# Patient Record
Sex: MALE | Race: WHITE | NOT HISPANIC OR LATINO | Employment: OTHER | URBAN - METROPOLITAN AREA
[De-identification: names, ages, dates, MRNs, and addresses within clinical notes are randomized per-mention and may not be internally consistent; named-entity substitution may affect disease eponyms.]

---

## 2023-01-03 ENCOUNTER — HOSPITAL ENCOUNTER (EMERGENCY)
Facility: HOSPITAL | Age: 68
Discharge: ANOTHER HOSPITAL | End: 2023-01-04
Attending: INTERNAL MEDICINE
Payer: MEDICARE

## 2023-01-03 DIAGNOSIS — R07.9 CHEST PAIN: ICD-10-CM

## 2023-01-03 DIAGNOSIS — I21.4 NSTEMI (NON-ST ELEVATED MYOCARDIAL INFARCTION): ICD-10-CM

## 2023-01-03 DIAGNOSIS — I24.9 ACUTE CORONARY SYNDROME WITH HIGH TROPONIN: Primary | ICD-10-CM

## 2023-01-03 LAB
ALBUMIN SERPL-MCNC: 3.7 G/DL (ref 3.4–4.8)
ALBUMIN/GLOB SERPL: 1.1 RATIO (ref 1.1–2)
ALP SERPL-CCNC: 90 UNIT/L (ref 40–150)
ALT SERPL-CCNC: 35 UNIT/L (ref 0–55)
APTT PPP: 27.3 SECONDS (ref 23.2–33.7)
AST SERPL-CCNC: 18 UNIT/L (ref 5–34)
BASOPHILS # BLD AUTO: 0.05 X10(3)/MCL (ref 0–0.2)
BASOPHILS NFR BLD AUTO: 0.4 %
BILIRUBIN DIRECT+TOT PNL SERPL-MCNC: 0.4 MG/DL
BNP BLD-MCNC: 67 PG/ML
BUN SERPL-MCNC: 17 MG/DL (ref 8.4–25.7)
CALCIUM SERPL-MCNC: 9.1 MG/DL (ref 8.8–10)
CHLORIDE SERPL-SCNC: 109 MMOL/L (ref 98–107)
CK MB SERPL-MCNC: 2.8 NG/ML
CK SERPL-CCNC: 150 U/L (ref 30–200)
CO2 SERPL-SCNC: 22 MMOL/L (ref 23–31)
CREAT SERPL-MCNC: 1.38 MG/DL (ref 0.73–1.18)
EOSINOPHIL # BLD AUTO: 0.36 X10(3)/MCL (ref 0–0.9)
EOSINOPHIL NFR BLD AUTO: 3.1 %
ERYTHROCYTE [DISTWIDTH] IN BLOOD BY AUTOMATED COUNT: 13.5 % (ref 11.6–14.4)
GFR SERPLBLD CREATININE-BSD FMLA CKD-EPI: 56 MLS/MIN/1.73/M2
GLOBULIN SER-MCNC: 3.3 GM/DL (ref 2.4–3.5)
GLUCOSE SERPL-MCNC: 136 MG/DL (ref 82–115)
HCT VFR BLD AUTO: 50.5 % (ref 42–52)
HGB BLD-MCNC: 16.7 GM/DL (ref 14–18)
IMM GRANULOCYTES # BLD AUTO: 0.05 X10(3)/MCL (ref 0–0.04)
IMM GRANULOCYTES NFR BLD AUTO: 0.4 %
INR BLD: 1.04 (ref 0–1.3)
LYMPHOCYTES # BLD AUTO: 3.37 X10(3)/MCL (ref 0.6–4.6)
LYMPHOCYTES NFR BLD AUTO: 28.7 %
MAGNESIUM SERPL-MCNC: 2.2 MG/DL (ref 1.6–2.6)
MCH RBC QN AUTO: 30.2 PG
MCHC RBC AUTO-ENTMCNC: 33.1 MG/DL (ref 33–36)
MCV RBC AUTO: 91.3 FL (ref 80–94)
MONOCYTES # BLD AUTO: 1.09 X10(3)/MCL (ref 0.1–1.3)
MONOCYTES NFR BLD AUTO: 9.3 %
NEUTROPHILS # BLD AUTO: 6.84 X10(3)/MCL (ref 2.1–9.2)
NEUTROPHILS NFR BLD AUTO: 58.1 %
PLATELET # BLD AUTO: 189 X10(3)/MCL (ref 140–371)
PMV BLD AUTO: 10.1 FL (ref 9.4–12.4)
POTASSIUM SERPL-SCNC: 4.1 MMOL/L (ref 3.5–5.1)
PROT SERPL-MCNC: 7 GM/DL (ref 5.8–7.6)
PROTHROMBIN TIME: 13.5 SECONDS (ref 12.5–14.5)
RBC # BLD AUTO: 5.53 X10(6)/MCL (ref 4.7–6.1)
SODIUM SERPL-SCNC: 141 MMOL/L (ref 136–145)
TROPONIN I SERPL-MCNC: 0.44 NG/ML (ref 0–0.04)
WBC # SPEC AUTO: 11.8 X10(3)/MCL (ref 4.5–11.5)

## 2023-01-03 PROCEDURE — 85730 THROMBOPLASTIN TIME PARTIAL: CPT | Performed by: INTERNAL MEDICINE

## 2023-01-03 PROCEDURE — 25000003 PHARM REV CODE 250: Performed by: INTERNAL MEDICINE

## 2023-01-03 PROCEDURE — 85025 COMPLETE CBC W/AUTO DIFF WBC: CPT | Performed by: INTERNAL MEDICINE

## 2023-01-03 PROCEDURE — 93010 ELECTROCARDIOGRAM REPORT: CPT | Mod: ,,, | Performed by: INTERNAL MEDICINE

## 2023-01-03 PROCEDURE — 80053 COMPREHEN METABOLIC PANEL: CPT | Performed by: INTERNAL MEDICINE

## 2023-01-03 PROCEDURE — 96374 THER/PROPH/DIAG INJ IV PUSH: CPT

## 2023-01-03 PROCEDURE — 63600175 PHARM REV CODE 636 W HCPCS: Performed by: INTERNAL MEDICINE

## 2023-01-03 PROCEDURE — 96376 TX/PRO/DX INJ SAME DRUG ADON: CPT

## 2023-01-03 PROCEDURE — 93010 EKG 12-LEAD: ICD-10-PCS | Mod: ,,, | Performed by: INTERNAL MEDICINE

## 2023-01-03 PROCEDURE — 82553 CREATINE MB FRACTION: CPT | Performed by: INTERNAL MEDICINE

## 2023-01-03 PROCEDURE — 83880 ASSAY OF NATRIURETIC PEPTIDE: CPT | Performed by: INTERNAL MEDICINE

## 2023-01-03 PROCEDURE — 99285 EMERGENCY DEPT VISIT HI MDM: CPT | Mod: 25

## 2023-01-03 PROCEDURE — 85610 PROTHROMBIN TIME: CPT | Performed by: INTERNAL MEDICINE

## 2023-01-03 PROCEDURE — 83735 ASSAY OF MAGNESIUM: CPT | Performed by: INTERNAL MEDICINE

## 2023-01-03 PROCEDURE — 93005 ELECTROCARDIOGRAM TRACING: CPT

## 2023-01-03 PROCEDURE — 84484 ASSAY OF TROPONIN QUANT: CPT | Performed by: INTERNAL MEDICINE

## 2023-01-03 PROCEDURE — 82550 ASSAY OF CK (CPK): CPT | Performed by: INTERNAL MEDICINE

## 2023-01-03 PROCEDURE — 96375 TX/PRO/DX INJ NEW DRUG ADDON: CPT

## 2023-01-03 RX ORDER — NAPROXEN SODIUM 220 MG/1
324 TABLET, FILM COATED ORAL ONCE
Status: COMPLETED | OUTPATIENT
Start: 2023-01-03 | End: 2023-01-03

## 2023-01-03 RX ORDER — NITROGLYCERIN 20 MG/100ML
0-400 INJECTION INTRAVENOUS CONTINUOUS
Status: DISCONTINUED | OUTPATIENT
Start: 2023-01-03 | End: 2023-01-04 | Stop reason: HOSPADM

## 2023-01-03 RX ORDER — FENTANYL CITRATE 50 UG/ML
75 INJECTION, SOLUTION INTRAMUSCULAR; INTRAVENOUS ONCE
Status: COMPLETED | OUTPATIENT
Start: 2023-01-03 | End: 2023-01-03

## 2023-01-03 RX ORDER — HEPARIN SODIUM,PORCINE/D5W 25000/250
0-40 INTRAVENOUS SOLUTION INTRAVENOUS CONTINUOUS
Status: DISCONTINUED | OUTPATIENT
Start: 2023-01-03 | End: 2023-01-04 | Stop reason: HOSPADM

## 2023-01-03 RX ORDER — ONDANSETRON 2 MG/ML
4 INJECTION INTRAMUSCULAR; INTRAVENOUS ONCE
Status: COMPLETED | OUTPATIENT
Start: 2023-01-03 | End: 2023-01-03

## 2023-01-03 RX ADMIN — HEPARIN SODIUM 11.95 UNITS/KG/HR: 10000 INJECTION, SOLUTION INTRAVENOUS at 11:01

## 2023-01-03 RX ADMIN — ASPIRIN 81 MG 324 MG: 81 TABLET ORAL at 11:01

## 2023-01-03 RX ADMIN — TICAGRELOR 180 MG: 90 TABLET ORAL at 11:01

## 2023-01-03 RX ADMIN — NITROGLYCERIN 5 MCG/MIN: 20 INJECTION INTRAVENOUS at 11:01

## 2023-01-03 RX ADMIN — FENTANYL CITRATE 75 MCG: 50 INJECTION, SOLUTION INTRAMUSCULAR; INTRAVENOUS at 10:01

## 2023-01-03 RX ADMIN — NITROGLYCERIN 1 INCH: 20 OINTMENT TOPICAL at 11:01

## 2023-01-03 RX ADMIN — ONDANSETRON 4 MG: 2 INJECTION INTRAMUSCULAR; INTRAVENOUS at 10:01

## 2023-01-04 VITALS
WEIGHT: 260 LBS | OXYGEN SATURATION: 98 % | DIASTOLIC BLOOD PRESSURE: 90 MMHG | SYSTOLIC BLOOD PRESSURE: 149 MMHG | HEART RATE: 90 BPM | RESPIRATION RATE: 19 BRPM | TEMPERATURE: 98 F | HEIGHT: 72 IN | BODY MASS INDEX: 35.21 KG/M2

## 2023-01-04 LAB
AMPHET UR QL SCN: NEGATIVE
AORTIC ROOT ANNULUS: 0 CM
AORTIC VALVE CUSP SEPERATION: 0.96 CM
APPEARANCE UR: ABNORMAL
APTT PPP: 45.9 SECONDS (ref 23.2–33.7)
AV INDEX (PROSTH): 0.64
AV MEAN GRADIENT: 4 MMHG
AV PEAK GRADIENT: 8 MMHG
AV VALVE AREA: 2.34 CM2
AV VELOCITY RATIO: 0.77
BACTERIA #/AREA URNS AUTO: ABNORMAL /HPF
BARBITURATE SCN PRESENT UR: NEGATIVE
BENZODIAZ UR QL SCN: NEGATIVE
BILIRUB UR QL STRIP.AUTO: NEGATIVE MG/DL
BSA FOR ECHO PROCEDURE: 2.45 M2
CANNABINOIDS UR QL SCN: NEGATIVE
COCAINE UR QL SCN: NEGATIVE
COLOR UR AUTO: ABNORMAL
CV ECHO LV RWT: 0.72 CM
DOP CALC AO PEAK VEL: 1.37 M/S
DOP CALC AO VTI: 24.4 CM
DOP CALC LVOT AREA: 3.7 CM2
DOP CALC LVOT DIAMETER: 2.16 CM
DOP CALC LVOT PEAK VEL: 1.05 M/S
DOP CALC LVOT STROKE VOLUME: 57.13 CM3
DOP CALC MV VTI: 18.7 CM
DOP CALCLVOT PEAK VEL VTI: 15.6 CM
E WAVE DECELERATION TIME: 310.51 MSEC
E/A RATIO: 0.68
ECHO LV POSTERIOR WALL: 1.48 CM (ref 0.6–1.1)
EJECTION FRACTION: 59 %
FENTANYL UR QL SCN: POSITIVE
FRACTIONAL SHORTENING: 22 % (ref 28–44)
GLUCOSE UR QL STRIP.AUTO: NEGATIVE MG/DL
INTERVENTRICULAR SEPTUM: 1.67 CM (ref 0.6–1.1)
KETONES UR QL STRIP.AUTO: NEGATIVE MG/DL
LEFT ATRIUM SIZE: 3.7 CM
LEFT INTERNAL DIMENSION IN SYSTOLE: 3.2 CM (ref 2.1–4)
LEFT VENTRICLE DIASTOLIC VOLUME INDEX: 31.14 ML/M2
LEFT VENTRICLE DIASTOLIC VOLUME: 74.12 ML
LEFT VENTRICLE MASS INDEX: 109 G/M2
LEFT VENTRICLE SYSTOLIC VOLUME INDEX: 17.2 ML/M2
LEFT VENTRICLE SYSTOLIC VOLUME: 41.03 ML
LEFT VENTRICULAR INTERNAL DIMENSION IN DIASTOLE: 4.1 CM (ref 3.5–6)
LEFT VENTRICULAR MASS: 260.31 G
LEUKOCYTE ESTERASE UR QL STRIP.AUTO: NEGATIVE UNIT/L
LVOT MG: 2.33 MMHG
LVOT MV: 0.7 CM/S
MDMA UR QL SCN: NEGATIVE
MUCOUS THREADS URNS QL MICRO: ABNORMAL /LPF
MV MEAN GRADIENT: 1 MMHG
MV PEAK A VEL: 0.65 M/S
MV PEAK E VEL: 0.44 M/S
MV PEAK GRADIENT: 2 MMHG
MV STENOSIS PRESSURE HALF TIME: 90.05 MS
MV VALVE AREA BY CONTINUITY EQUATION: 3.06 CM2
MV VALVE AREA P 1/2 METHOD: 2.44 CM2
NITRITE UR QL STRIP.AUTO: NEGATIVE
OPIATES UR QL SCN: NEGATIVE
PCP UR QL: NEGATIVE
PH UR STRIP.AUTO: 5.5 [PH]
PH UR: 5.5 [PH] (ref 3–11)
PROT UR QL STRIP.AUTO: 30 MG/DL
PV PEAK VELOCITY: 0.87 CM/S
RBC #/AREA URNS AUTO: ABNORMAL /HPF
RBC UR QL AUTO: ABNORMAL UNIT/L
RIGHT VENTRICULAR END-DIASTOLIC DIMENSION: 2.8 CM
SP GR UR STRIP.AUTO: 1.02
SPECIFIC GRAVITY, URINE AUTO (.000) (OHS): 1.02 (ref 1–1.03)
SQUAMOUS #/AREA URNS AUTO: ABNORMAL /HPF
TROPONIN I SERPL-MCNC: 0.71 NG/ML (ref 0–0.04)
TROPONIN I SERPL-MCNC: 2 NG/ML (ref 0–0.04)
UROBILINOGEN UR STRIP-ACNC: 0.2 MG/DL
WBC #/AREA URNS AUTO: ABNORMAL /HPF

## 2023-01-04 PROCEDURE — 93010 ELECTROCARDIOGRAM REPORT: CPT | Mod: ,,, | Performed by: INTERNAL MEDICINE

## 2023-01-04 PROCEDURE — 85730 THROMBOPLASTIN TIME PARTIAL: CPT | Performed by: INTERNAL MEDICINE

## 2023-01-04 PROCEDURE — 84484 ASSAY OF TROPONIN QUANT: CPT | Performed by: INTERNAL MEDICINE

## 2023-01-04 PROCEDURE — 63600175 PHARM REV CODE 636 W HCPCS: Performed by: INTERNAL MEDICINE

## 2023-01-04 PROCEDURE — 93005 ELECTROCARDIOGRAM TRACING: CPT

## 2023-01-04 PROCEDURE — 80307 DRUG TEST PRSMV CHEM ANLYZR: CPT | Performed by: INTERNAL MEDICINE

## 2023-01-04 PROCEDURE — 25000003 PHARM REV CODE 250: Performed by: INTERNAL MEDICINE

## 2023-01-04 PROCEDURE — 93010 EKG 12-LEAD: ICD-10-PCS | Mod: ,,, | Performed by: INTERNAL MEDICINE

## 2023-01-04 PROCEDURE — 81001 URINALYSIS AUTO W/SCOPE: CPT | Performed by: INTERNAL MEDICINE

## 2023-01-04 RX ORDER — FENTANYL CITRATE 50 UG/ML
50 INJECTION, SOLUTION INTRAMUSCULAR; INTRAVENOUS
Status: COMPLETED | OUTPATIENT
Start: 2023-01-04 | End: 2023-01-04

## 2023-01-04 RX ORDER — ONDANSETRON 2 MG/ML
4 INJECTION INTRAMUSCULAR; INTRAVENOUS ONCE
Status: COMPLETED | OUTPATIENT
Start: 2023-01-04 | End: 2023-01-04

## 2023-01-04 RX ORDER — ATORVASTATIN CALCIUM 40 MG/1
40 TABLET, FILM COATED ORAL ONCE
Status: COMPLETED | OUTPATIENT
Start: 2023-01-04 | End: 2023-01-04

## 2023-01-04 RX ORDER — FENTANYL CITRATE 50 UG/ML
100 INJECTION, SOLUTION INTRAMUSCULAR; INTRAVENOUS ONCE
Status: DISCONTINUED | OUTPATIENT
Start: 2023-01-04 | End: 2023-01-04

## 2023-01-04 RX ADMIN — FENTANYL CITRATE 50 MCG: 50 INJECTION, SOLUTION INTRAMUSCULAR; INTRAVENOUS at 03:01

## 2023-01-04 RX ADMIN — HEPARIN SODIUM 15 UNITS/KG/HR: 10000 INJECTION, SOLUTION INTRAVENOUS at 09:01

## 2023-01-04 RX ADMIN — ATORVASTATIN CALCIUM 40 MG: 40 TABLET, FILM COATED ORAL at 12:01

## 2023-01-04 RX ADMIN — ONDANSETRON 4 MG: 2 INJECTION INTRAMUSCULAR; INTRAVENOUS at 02:01

## 2023-01-04 RX ADMIN — NITROGLYCERIN 30 MCG/MIN: 20 INJECTION INTRAVENOUS at 08:01

## 2023-01-04 RX ADMIN — NITROGLYCERIN 15 MCG/MIN: 20 INJECTION INTRAVENOUS at 02:01

## 2023-01-04 RX ADMIN — NITROGLYCERIN 10 MCG/MIN: 20 INJECTION INTRAVENOUS at 12:01

## 2023-01-04 NOTE — ED PROVIDER NOTES
Date of note:  1/4/2023  Time of note:  9:00 a.m.  Source of history is patient and his daughter    I am Dr. Deutsch I assumed the care of patient at 6:00 a.m.    I was informed that patient is the here with complaint of chest pain, he is on Tridil drip and heparin drip, his blood pressure was elevated is coming down, feeling better at this time    Patient's initial troponin was 0.437 which was repeated at midnight and was 0.7, he was started on heparin drip and Tridil drip and around 5 in the morning his troponin went up to 2.0.  At that time we were still trying to find a bed for him, I was advised that the at 6:00 a.m. they will have the cath lab available in [facility] and he will be able to go over there.    I decided to obtain a detailed history from the patient as there was no information is told in the system.  Had to go over every single piece of information with the patient.    Past Medical History:   Diagnosis Date    Coronary artery disease       Past Surgical History:   Procedure Laterality Date    INSERTION, BARE METAL STENT, CORONARY ARTERY  2006    NECK SURGERY      SHOULDER SURGERY        Social History     Socioeconomic History    Marital status:    Tobacco Use    Smoking status: Every Day     Types: Cigarettes    Smokeless tobacco: Never   Substance and Sexual Activity    Alcohol use: Not Currently    Drug use: Never      Family History   Problem Relation Age of Onset    Cancer Mother     Hypertension Mother     Breast cancer Mother     Heart disease Father     Hypertension Father     Cancer Father     Prostate cancer Father     Coronary artery disease Father       Vitals:    01/04/23 0917   BP: (!) 149/90   Pulse: 90   Resp: 19   Temp: 98 °F (36.7 °C)       BP (!) 149/90   Pulse 90   Temp 98 °F (36.7 °C)   Resp 19   Ht 6' (1.829 m)   Wt 117.9 kg (260 lb)   SpO2 98%   BMI 35.26 kg/m²     PHYSICAL EXAM:  Vital Signs: Reviewed As In Chart.  General:  Alert, No Acute  Distress.   Skin: Normal For Ethnic Origin  ENT: Grossly Within Normal limit.  Eye:  Extraocular Movements Are Intact.   Cardiovascular:  Regular Rate And Rhythm, No Murmur.    Respiratory:  Respirations Are Non-Labored Good Bilateral Air Entry, No Rales, No Rhonchi.    Musculoskeletal:  No Deformity.   Gastrointestinal:  Soft, Nontender, Non Distended, Normal Bowel Sounds.    Neurological:  Alert And Oriented To Person, Place, Time, And Situation, Normal Motor Observed, Normal Speech Observed.    Psychiatric:  Cooperative, Appropriate Mood & Affect.     Labs and other orders in the emergency room    Orders Placed This Encounter    Critical Care    Pulse Oximeter    X-Ray Chest 1 View    CBC Auto Differential    Comprehensive Metabolic Panel    Protime-INR    APTT    Magnesium    CK-MB    CK    Troponin I    BNP    CBC with Differential    Drug Screen, Urine    Urinalysis, Reflex to Urine Culture Urine, Clean Catch    Troponin I    APTT    Urinalysis, Microscopic    Troponin I    APTT    Diet NPO    Draw aPTT level 6 hours after start of infusion; adjust dosage and order aPTT based on the nomogram in hyperlink    Do not administer any intramuscular injections, call physician for an alternative route or medication if medication is needed    If bleeding occurs, or HIT is suspected, stop heparin infusion and contact physician    Cardiac Monitoring - Adult    Inpatient consult to Cardiology    Inpatient consult to Telemedicine-Card    EKG 12-lead    EKG 12-lead    Echo    PFC Facilitated Request    aspirin chewable tablet 324 mg    nitroGLYCERIN 2% TD oint ointment 1 inch    heparin 25,000 units in dextrose 5% (100 units/ml) IV bolus from bag INITIAL BOLUS (max bolus 4000 units)    heparin 25,000 units in dextrose 5% 250 mL (100 units/mL) infusion LOW INTENSITY nomogram - LAF    heparin 25,000 units in dextrose 5% (100 units/ml) IV bolus from bag - ADDITIONAL PRN BOLUS - 60 units/kg (max bolus 4000 units)    heparin  25,000 units in dextrose 5% (100 units/ml) IV bolus from bag - ADDITIONAL PRN BOLUS - 30 units/kg (max bolus 4000 units)    fentaNYL 50 mcg/mL injection 75 mcg    ondansetron injection 4 mg    nitroGLYCERIN in 5 % dextrose 50 mg/250 mL (200 mcg/mL) infusion    ticagrelor tablet 180 mg    atorvastatin tablet 40 mg    ondansetron injection 4 mg    fentaNYL injection 50 mcg     Medicines ordered in the emergency room  Medications   heparin 25,000 units in dextrose 5% 250 mL (100 units/mL) infusion LOW INTENSITY nomogram - LAF (15 Units/kg/hr × 93.7 kg (Adjusted) Intravenous Rate/Dose Change 1/4/23 0552)   heparin 25,000 units in dextrose 5% (100 units/ml) IV bolus from bag - ADDITIONAL PRN BOLUS - 60 units/kg (max bolus 4000 units) (4,000 Units Intravenous Bolus from Bag 1/4/23 0551)   heparin 25,000 units in dextrose 5% (100 units/ml) IV bolus from bag - ADDITIONAL PRN BOLUS - 30 units/kg (max bolus 4000 units) (has no administration in time range)   nitroGLYCERIN in 5 % dextrose 50 mg/250 mL (200 mcg/mL) infusion (30 mcg/min Intravenous New Bag 1/4/23 0832)   aspirin chewable tablet 324 mg (324 mg Oral Given 1/3/23 2307)   nitroGLYCERIN 2% TD oint ointment 1 inch (1 inch Topical (Top) Given 1/3/23 2307)   heparin 25,000 units in dextrose 5% (100 units/ml) IV bolus from bag INITIAL BOLUS (max bolus 4000 units) (4,000 Units Intravenous Bolus from Bag 1/3/23 2303)   fentaNYL 50 mcg/mL injection 75 mcg (75 mcg Intravenous Given 1/3/23 2259)   ondansetron injection 4 mg (4 mg Intravenous Given 1/3/23 2258)   ticagrelor tablet 180 mg (180 mg Oral Given 1/3/23 2339)   atorvastatin tablet 40 mg (40 mg Oral Given 1/4/23 0008)   ondansetron injection 4 mg (4 mg Intravenous Given 1/4/23 0244)   fentaNYL injection 50 mcg (50 mcg Intravenous Given 1/4/23 0300)     Labs ordered and reviewed in the emergency room  Admission on 01/03/2023   Component Date Value Ref Range Status    Sodium Level 01/03/2023 141  136 - 145 mmol/L Final     Potassium Level 01/03/2023 4.1  3.5 - 5.1 mmol/L Final    Chloride 01/03/2023 109 (H)  98 - 107 mmol/L Final    Carbon Dioxide 01/03/2023 22 (L)  23 - 31 mmol/L Final    Glucose Level 01/03/2023 136 (H)  82 - 115 mg/dL Final    Blood Urea Nitrogen 01/03/2023 17.0  8.4 - 25.7 mg/dL Final    Creatinine 01/03/2023 1.38 (H)  0.73 - 1.18 mg/dL Final    Calcium Level Total 01/03/2023 9.1  8.8 - 10.0 mg/dL Final    Protein Total 01/03/2023 7.0  5.8 - 7.6 gm/dL Final    Albumin Level 01/03/2023 3.7  3.4 - 4.8 g/dL Final    Globulin 01/03/2023 3.3  2.4 - 3.5 gm/dL Final    Albumin/Globulin Ratio 01/03/2023 1.1  1.1 - 2.0 ratio Final    Bilirubin Total 01/03/2023 0.4  <=1.5 mg/dL Final    Alkaline Phosphatase 01/03/2023 90  40 - 150 unit/L Final    Alanine Aminotransferase 01/03/2023 35  0 - 55 unit/L Final    Aspartate Aminotransferase 01/03/2023 18  5 - 34 unit/L Final    eGFR 01/03/2023 56  mls/min/1.73/m2 Final    PT 01/03/2023 13.5  12.5 - 14.5 seconds Final    INR 01/03/2023 1.04  0.00 - 1.30 Final    PTT 01/03/2023 27.3  23.2 - 33.7 seconds Final    Magnesium Level 01/03/2023 2.20  1.60 - 2.60 mg/dL Final    Creatine Kinase MB 01/03/2023 2.8  <=7.2 ng/mL Final    Creatine Kinase 01/03/2023 150  30 - 200 U/L Final    Troponin-I 01/03/2023 0.437 (H)  0.000 - 0.045 ng/mL Final    Natriuretic Peptide 01/03/2023 67.0  <=100.0 pg/mL Final    WBC 01/03/2023 11.8 (H)  4.5 - 11.5 x10(3)/mcL Final    RBC 01/03/2023 5.53  4.70 - 6.10 x10(6)/mcL Final    Hgb 01/03/2023 16.7  14.0 - 18.0 gm/dL Final    Hct 01/03/2023 50.5  42.0 - 52.0 % Final    MCV 01/03/2023 91.3  80.0 - 94.0 fL Final    MCH 01/03/2023 30.2  pg Final    MCHC 01/03/2023 33.1  33.0 - 36.0 mg/dL Final    RDW 01/03/2023 13.5  11.6 - 14.4 % Final    Platelet 01/03/2023 189  140 - 371 x10(3)/mcL Final    MPV 01/03/2023 10.1  9.4 - 12.4 fL Final    Neut % 01/03/2023 58.1  % Final    Lymph % 01/03/2023 28.7  % Final    Mono % 01/03/2023 9.3  % Final    Eos %  01/03/2023 3.1  % Final    Basophil % 01/03/2023 0.4  % Final    Lymph # 01/03/2023 3.37  0.6 - 4.6 x10(3)/mcL Final    Neut # 01/03/2023 6.84  2.1 - 9.2 x10(3)/mcL Final    Mono # 01/03/2023 1.09  0.1 - 1.3 x10(3)/mcL Final    Eos # 01/03/2023 0.36  0 - 0.9 x10(3)/mcL Final    Baso # 01/03/2023 0.05  0 - 0.2 x10(3)/mcL Final    IG# 01/03/2023 0.05 (H)  0 - 0.04 x10(3)/mcL Final    IG% 01/03/2023 0.4  % Final    Amphetamines, Urine 01/04/2023 Negative  Negative Final    Barbituates, Urine 01/04/2023 Negative  Negative Final    Benzodiazepine, Urine 01/04/2023 Negative  Negative Final    Cannabinoids, Urine 01/04/2023 Negative  Negative Final    Cocaine, Urine 01/04/2023 Negative  Negative Final    Fentanyl, Urine 01/04/2023 Positive (A)  Negative Final    MDMA, Urine 01/04/2023 Negative  Negative Final    Opiates, Urine 01/04/2023 Negative  Negative Final    Phencyclidine, Urine 01/04/2023 Negative  Negative Final    pH, Urine 01/04/2023 5.5  3.0 - 11.0 Final    Specific Gravity, Urine Auto 01/04/2023 1.025  1.001 - 1.035 Final    Color, UA 01/04/2023 Orange (A)  Yellow, Light-Yellow, Dark Yellow, Shona, Straw Final    Appearance, UA 01/04/2023 Turbid (A)  Clear Final    Specific Gravity, UA 01/04/2023 1.025   Final    pH, UA 01/04/2023 5.5  5.0 - 8.5 Final    Protein, UA 01/04/2023 30 (A)  Negative mg/dL Final    Glucose, UA 01/04/2023 Negative  Negative, Normal mg/dL Final    Ketones, UA 01/04/2023 Negative  Negative mg/dL Final    Blood, UA 01/04/2023 Large (A)  Negative unit/L Final    Bilirubin, UA 01/04/2023 Negative  Negative mg/dL Final    Urobilinogen, UA 01/04/2023 0.2  0.2, 1.0, Normal mg/dL Final    Nitrites, UA 01/04/2023 Negative  Negative Final    Leukocyte Esterase, UA 01/04/2023 Negative  Negative unit/L Final    Troponin-I 01/04/2023 0.710 (H)  0.000 - 0.045 ng/mL Final    Bacteria, UA 01/04/2023 None Seen  None Seen, Rare, Occasional /HPF Final    Mucous, UA 01/04/2023 Trace (A)  None Seen /LPF  Final    RBC, UA 01/04/2023 21-50 (A)  None Seen, 0-2, 3-5, 0-5 /HPF Final    WBC, UA 01/04/2023 3-5  None Seen, 0-2, 3-5, 0-5 /HPF Final    Squamous Epithelial Cells, UA 01/04/2023 Rare  None Seen, Rare, Occasional, Occ /HPF Final    PTT 01/04/2023 45.9 (H)  23.2 - 33.7 seconds Final    Troponin-I 01/04/2023 2.001 (H)  0.000 - 0.045 ng/mL Final       ECG Results              EKG 12-lead (Final result)  Result time 01/04/23 08:19:52      Final result by Interface, Lab In [facility] (01/04/23 08:19:52)                   Narrative:    Test Reason : R07.9,    Vent. Rate : 091 BPM     Atrial Rate : 091 BPM     P-R Int : 160 ms          QRS Dur : 100 ms      QT Int : 394 ms       P-R-T Axes : 050 021 067 degrees     QTc Int : 484 ms    Normal sinus rhythm  Possible Inferior infarct ,age undetermined  Abnormal ECG  Confirmed by Larry Arriaga MD ([id number]) on 1/4/2023 8:19:39 AM    Referred By: [email or web address]   SELF           Confirmed By:Larry Arriaga MD                      ED Interpretation by Wilmer Wolf DO (01/03/23 22:35:01, [facility] - Emergency Dept, Emergency Medicine) Flagged as Abnormal    Independent ECG Interpretation:    NSR at rate of 95. Normal intervals. Normal QRS. Demand ischemia anterior and lateral leads. Overall impression: Abnormal                                        EKG 12-lead (Final result)  Result time 01/04/23 08:19:42      Final result by Interface, Lab In [facility] (01/04/23 08:19:42)                   Narrative:    Test Reason : I24.9,    Vent. Rate : 095 BPM     Atrial Rate : 095 BPM     P-R Int : 176 ms          QRS Dur : 106 ms      QT Int : 382 ms       P-R-T Axes : 062 063 191 degrees     QTc Int : 480 ms    Normal sinus rhythm  Marked ST abnormality, possible inferior subendocardial injury  Prolonged QT  Abnormal ECG  No previous ECGs available  Confirmed by Larry Arriaga MD ([id number]) on 1/4/2023 8:19:32 AM    Referred By: ALEC   SELF           Confirmed By:Larry Arriaga  MD                      ED Interpretation by Wilmer Wolf DO (01/04/23 04:58:17, [facility] - Emergency Dept, Emergency Medicine)    Independent ECG Interpretation:    NSR at rate of 91. Normal intervals. Normal QRS. Nonspecific ST or T wave abnormalities. Overall impression: Abnormal, much improved compared to previous                                    Imaging Results              X-Ray Chest 1 View (Final result)  Result time 01/04/23 09:11:03      Final result by Tyree Romeo MD (01/04/23 09:11:03)                   Impression:      1. Scattered interstitial opacities throughout the lungs bilaterally which may represent a chronic process.  A acute component cannot be excluded.  Clinical correlation is indicated  2. Mildly elevated right hemidiaphragm      Electronically signed by: Tyree Romeo  Date:    01/04/2023  Time:    09:11               Narrative:    EXAMINATION:  XR CHEST 1 VIEW    CLINICAL HISTORY:  chest pain;, .    COMPARISON:  None available    FINDINGS:  An AP view or more reveals the heart to be normal in size.  The trachea is midline.  Hazy interstitial opacities are scattered throughout the lungs bilaterally.  No consolidative infiltrate or effusion is seen.  There is elevation of the right hemidiaphragm.                                    MDM  Then they called me saying that they need a echocardiogram on him and I ordered an echocardiogram which is actually being done right now    I talked to patient, patient complains of some fullness in the neck, we had to increase the dose of his Tridil,    I was informed that the they do not have any beds available in [place], and they will call around to find out where they have a bed available.    At this time I was called saying that they have a bed available in [facility],    I called [facility] Emergency [facility] and talk to Dr. Cheng who accepted the patient for transfer to [facility] where they can get a catheterization done and further workup done by the cardiologist.      We are calling the ambulance at this time to transfer him over to [facility].      Patient is essentially having a non STEMI, he also have some microscopic hematuria, he has gotten Tridil drip, heparin drip, and Brilinta, patient has history of coronary disease, he had a stent done 18 years back and is not on any medicines at all these days.    09:15  The Echo is done, unfortunately we can not get it to [facility] fast enough and to get it read, so we are going to give a copy of the ECHO to [facility] to bring with them. It will take another 20 minutes to process the ECHO and put on disc.    09:19  Ambulance is here to pick pt.      Critical care  Time spent in critical care (in addition to E/M time mentioned above) spent on the evaluation and treatment of severe organ dysfunction, review of pertinent labs and imaging studies, discussions with consulting providers and discussions with patient/family, adjusting meds, managing transfer arrangements, talking to [facility] 35 min      ICD-10-CM ICD-9-CM   1. Acute coronary syndrome with high troponin  I24.9 411.1   2. Chest pain  R07.9 786.50   3. NSTEMI (non-ST elevated myocardial infarction)  I21.4 410.70       ED Disposition Condition    Transfer to Another Facility Serious                   Rick Deutsch MD  01/04/23 0920       Rick Deutsch MD  01/04/23 0956

## 2023-01-04 NOTE — ED PROVIDER NOTES
Source of History:  Patient, no limitations    Chief complaint:  Chest Pain (Pt c/o cp starting approx 1 hr ago. Pt took baby asa before coming into er.)      HPI:  Deacon Delgado is a 67 y.o. male presenting with Chest Pain (Pt c/o cp starting approx 1 hr ago. Pt took baby asa before coming into er.)       Typical type chest pain, onset 1 hour prior to arrival, history of previous MI at age 49 with stent placement at [facility], does not take any medications nor see a doctor on a regular basis    The patient complains of chest pain. The discomfort is described as vice-like with radiation to the left neck/jaw. Onset of symptoms was abrupt starting 1 hour ago, unchanged course since that time. The episodes are brought on by nothing . Patient's cardiac risk factors are advanced age (older than 55 for men, 65 for women), dyslipidemia, hypertension, male gender, obesity (BMI >= 30 kg/m2), sedentary lifestyle, and smoking/ tobacco exposure. Care prior to arrival consisted of 81 mg ASA, with no relief.        Review of Systems   Constitutional symptoms:  Negative except as documented in HPI.   Skin symptoms:  Negative except as documented in HPI.   HEENT symptoms:  Negative except as documented in HPI.   Respiratory symptoms:  Negative except as documented in HPI.   Cardiovascular symptoms:  Negative except as documented in HPI.   Gastrointestinal symptoms:  Negative except as documented in HPI.    Genitourinary symptoms:  Negative except as documented in HPI.   Musculoskeletal symptoms:  Negative except as documented in HPI.   Neurologic symptoms:  Negative except as documented in HPI.   Psychiatric symptoms:  Negative except as documented in HPI.   Allergy/immunologic symptoms:  Negative except as documented in HPI.             Additional review of systems information: All other systems reviewed and otherwise negative.      Review of patient's allergies indicates:  No Known Allergies    PMH:  As per HPI and  below:    History reviewed. No pertinent past medical history.     History reviewed. No pertinent family history.    No past surgical history on file.         There is no problem list on file for this patient.       Physical Exam:    BP (!) 203/124 (BP Location: Right arm, Patient Position: Lying)   Pulse 92   Temp 97.8 °F (36.6 °C) (Oral)   Resp 18   Ht 6' (1.829 m)   Wt 117.9 kg (260 lb)   SpO2 97%   BMI 35.26 kg/m²     Nursing note and vital signs reviewed.    General:  Alert, moderate distress holding chest  Skin: Normal for Ethnic Origin, No cyanosis  HEENT: Normocephalic and atraumatic, Vision unchanged, Pupils symmetric, No icterus , Nasal mucosa is pink and moist  Cardiovascular:  Regular rate and rhythm, No edema  Chest Wall: No deformity, equal chest rise  Respiratory:  Lungs are clear to auscultation, respirations are non-labored.    Musculoskeletal:  No deformity, Normal perfusion to all extremities  Gastrointestinal:  Soft, Non distended  Neurological:  Alert and oriented, normal motor observed, normal speech observed.    Psychiatric:  Cooperative, appropriate mood & affect.        Labs that have been ordered have been independently reviewed and interpreted by myself.     Old Chart Reviewed.      Initial Impression/ Differential Dx:  Myocardial ischemia, pericarditis, pulmonary embolus, chest wall pain, pleural inflammation and pulmonary infectious causes.      MDM:      Reviewed Nurses Note.    Reviewed Pertinent old records.    Orders Placed This Encounter    Critical Care    Pulse Oximeter    X-Ray Chest 1 View    CBC Auto Differential    Comprehensive Metabolic Panel    Protime-INR    APTT    Magnesium    CK-MB    CK    Troponin I    BNP    CBC with Differential    Drug Screen, Urine    Urinalysis, Reflex to Urine Culture Urine, Clean Catch    Troponin I    Diet NPO    Draw aPTT level 6 hours after start of infusion; adjust dosage and order aPTT based on the nomogram in hyperlink    Do not  administer any intramuscular injections, call physician for an alternative route or medication if medication is needed    If bleeding occurs, or HIT is suspected, stop heparin infusion and contact physician    Cardiac Monitoring - Adult    Inpatient consult to Cardiology    EKG 12-lead    PFC Facilitated Request    aspirin chewable tablet 324 mg    nitroGLYCERIN 2% TD oint ointment 1 inch    heparin 25,000 units in dextrose 5% (100 units/ml) IV bolus from bag INITIAL BOLUS (max bolus 4000 units)    heparin 25,000 units in dextrose 5% 250 mL (100 units/mL) infusion LOW INTENSITY nomogram - LAF    heparin 25,000 units in dextrose 5% (100 units/ml) IV bolus from bag - ADDITIONAL PRN BOLUS - 60 units/kg (max bolus 4000 units)    heparin 25,000 units in dextrose 5% (100 units/ml) IV bolus from bag - ADDITIONAL PRN BOLUS - 30 units/kg (max bolus 4000 units)    fentaNYL 50 mcg/mL injection 75 mcg    ondansetron injection 4 mg    nitroGLYCERIN in 5 % dextrose 50 mg/250 mL (200 mcg/mL) infusion    ticagrelor tablet 180 mg                    Labs Reviewed   COMPREHENSIVE METABOLIC PANEL - Abnormal; Notable for the following components:       Result Value    Chloride 109 (*)     Carbon Dioxide 22 (*)     Glucose Level 136 (*)     Creatinine 1.38 (*)     All other components within normal limits   TROPONIN I - Abnormal; Notable for the following components:    Troponin-I 0.437 (*)     All other components within normal limits   CBC WITH DIFFERENTIAL - Abnormal; Notable for the following components:    WBC 11.8 (*)     IG# 0.05 (*)     All other components within normal limits   PROTIME-INR - Normal   APTT - Normal   MAGNESIUM - Normal   CK-MB - Normal   CK - Normal   B-TYPE NATRIURETIC PEPTIDE - Normal   CBC W/ AUTO DIFFERENTIAL    Narrative:     The following orders were created for panel order CBC Auto Differential.  Procedure                               Abnormality         Status                     ---------                                -----------         ------                     CBC with Differential[[id number]]        Abnormal            Final result                 Please view results for these tests on the individual orders.   DRUG SCREEN, URINE (BEAKER)   URINALYSIS, REFLEX TO URINE CULTURE   TROPONIN I          X-Ray Chest 1 View    (Results Pending)        Admission on 01/03/2023   Component Date Value Ref Range Status    Sodium Level 01/03/2023 141  136 - 145 mmol/L Final    Potassium Level 01/03/2023 4.1  3.5 - 5.1 mmol/L Final    Chloride 01/03/2023 109 (H)  98 - 107 mmol/L Final    Carbon Dioxide 01/03/2023 22 (L)  23 - 31 mmol/L Final    Glucose Level 01/03/2023 136 (H)  82 - 115 mg/dL Final    Blood Urea Nitrogen 01/03/2023 17.0  8.4 - 25.7 mg/dL Final    Creatinine 01/03/2023 1.38 (H)  0.73 - 1.18 mg/dL Final    Calcium Level Total 01/03/2023 9.1  8.8 - 10.0 mg/dL Final    Protein Total 01/03/2023 7.0  5.8 - 7.6 gm/dL Final    Albumin Level 01/03/2023 3.7  3.4 - 4.8 g/dL Final    Globulin 01/03/2023 3.3  2.4 - 3.5 gm/dL Final    Albumin/Globulin Ratio 01/03/2023 1.1  1.1 - 2.0 ratio Final    Bilirubin Total 01/03/2023 0.4  <=1.5 mg/dL Final    Alkaline Phosphatase 01/03/2023 90  40 - 150 unit/L Final    Alanine Aminotransferase 01/03/2023 35  0 - 55 unit/L Final    Aspartate Aminotransferase 01/03/2023 18  5 - 34 unit/L Final    eGFR 01/03/2023 56  mls/min/1.73/m2 Final    PT 01/03/2023 13.5  12.5 - 14.5 seconds Final    INR 01/03/2023 1.04  0.00 - 1.30 Final    PTT 01/03/2023 27.3  23.2 - 33.7 seconds Final    Magnesium Level 01/03/2023 2.20  1.60 - 2.60 mg/dL Final    Creatine Kinase MB 01/03/2023 2.8  <=7.2 ng/mL Final    Creatine Kinase 01/03/2023 150  30 - 200 U/L Final    Troponin-I 01/03/2023 0.437 (H)  0.000 - 0.045 ng/mL Final    Natriuretic Peptide 01/03/2023 67.0  <=100.0 pg/mL Final    WBC 01/03/2023 11.8 (H)  4.5 - 11.5 x10(3)/mcL Final    RBC 01/03/2023 5.53  4.70 - 6.10 x10(6)/mcL Final    Hgb  01/03/2023 16.7  14.0 - 18.0 gm/dL Final    Hct 01/03/2023 50.5  42.0 - 52.0 % Final    MCV 01/03/2023 91.3  80.0 - 94.0 fL Final    MCH 01/03/2023 30.2  pg Final    MCHC 01/03/2023 33.1  33.0 - 36.0 mg/dL Final    RDW 01/03/2023 13.5  11.6 - 14.4 % Final    Platelet 01/03/2023 189  140 - 371 x10(3)/mcL Final    MPV 01/03/2023 10.1  9.4 - 12.4 fL Final    Neut % 01/03/2023 58.1  % Final    Lymph % 01/03/2023 28.7  % Final    Mono % 01/03/2023 9.3  % Final    Eos % 01/03/2023 3.1  % Final    Basophil % 01/03/2023 0.4  % Final    Lymph # 01/03/2023 3.37  0.6 - 4.6 x10(3)/mcL Final    Neut # 01/03/2023 6.84  2.1 - 9.2 x10(3)/mcL Final    Mono # 01/03/2023 1.09  0.1 - 1.3 x10(3)/mcL Final    Eos # 01/03/2023 0.36  0 - 0.9 x10(3)/mcL Final    Baso # 01/03/2023 0.05  0 - 0.2 x10(3)/mcL Final    IG# 01/03/2023 0.05 (H)  0 - 0.04 x10(3)/mcL Final    IG% 01/03/2023 0.4  % Final       Imaging Results              X-Ray Chest 1 View (In process)                       ECG Results               EKG 12-lead (Preliminary result)  Result time 01/03/23 22:35:01      ED Interpretation by Wilmer Wolf DO (01/03/23 22:35:01, [facility] - Emergency Dept, Emergency Medicine) Flagged as Abnormal    Independent ECG Interpretation:    NSR at rate of 95. Normal intervals. Normal QRS. Demand ischemia anterior and lateral leads. Overall impression: Abnormal                                                ED Course as of 01/04/23 0558   Tue Jan 03, 2023   2315 Troponin I(!): 0.437 [MP]   2320 Transfer center [place] beds [place] the state, Requires tele cards prior to transfer for heart cath [MP]   2332 Spoke with cardiologist on-call and due to bed situation across the state he recommends continuing heparin drip as well as nitro drip until able to bring him to the cath lab in the morning [MP]   Wed Jan 04, 2023   0131 Fentanyl, Urine(!): Positive  This was given to him prior to collection [MP]      ED Course User  Index  [MP] iWlmer Wolf DO                  Critical Care    Date/Time: 1/3/2023 11:41 PM  Performed by: Wilmer Wolf DO  Authorized by: Wilmer Wolf DO   Total critical care time (exclusive of procedural time) : 0 minutes  Critical care was necessary to treat or prevent imminent or life-threatening deterioration of the following conditions: cardiac failure.  Critical care was time spent personally by me on the following activities: discussions with consultants, interpretation of cardiac output measurements, evaluation of patient's response to treatment, examination of patient, obtaining history from patient or surrogate, ordering and performing treatments and interventions, ordering and review of laboratory studies, ordering and review of radiographic studies, pulse oximetry and re-evaluation of patient's condition.            Diagnostic Impression:    1. Acute coronary syndrome with high troponin    2. Chest pain         ED Disposition Condition    Transfer to Another Facility Serious                       Wilmer Wolf DO  01/03/23 2341       Wilmer Wolf DO  01/04/23 0558

## 2023-01-04 NOTE — CONSULTS
[facility] - Emergency Dept  Cardiology  Consult Note    Patient Name: Deacon Delgado  MRN: [id number]  Admission Date: 1/3/2023  Hospital Length of Stay: 0 days  Code Status: No Order   Attending Provider:  Dr. Wilmer Wolf  Consulting Provider: Abril Faust NP  Primary Care Physician: Primary Doctor No  Principal Problem:<principal problem not specified>    Patient information was obtained from patient and ER records.     Consults  Subjective:     Chief Complaint:  CP radiating to jaw     HPI: 68 yo male unknown to [facility] and last seen by cardiology approximately 17 years ago after MI and stenting presents to ER with c/o squeezing CP radiating to jaw that started while lying down and watching television with associated difficulty breathing and feeling clammy, no nausea, palpitations or TIA type symptoms. Work up in ER reveals a mildly elevated troponin, EKG with anterolateral ST depressions. [facility] consulted by Dr. Wolf for CP, elevated troponin.    PMH:  MI approximately 17 years ago, seen at [facility] and received coronary stent    PSHx: back sx, neck sx,  left shoulder sx    Review of patient's allergies indicates:  No Known Allergies    No current facility-administered medications on file prior to encounter.     No current outpatient medications on file prior to encounter.     Family History    None       Tobacco Use    Smoking status: Not on file    Smokeless tobacco: Not on file   Substance and Sexual Activity    Alcohol use: Not on file    Drug use: Not on file    Sexual activity: Not on file     Review of Systems   Constitutional: Negative.   HENT: Negative.     Eyes: Negative.    Cardiovascular:  Positive for chest pain. Negative for leg swelling and palpitations.   Respiratory:  Positive for shortness of breath.    Endocrine: Negative.    Hematologic/Lymphatic: Negative.    Skin: Negative.    Musculoskeletal: Negative.    Genitourinary: Negative.    Psychiatric/Behavioral:  Negative.     Allergic/Immunologic: Negative.    Objective:     Vital Signs (Most Recent):  Temp: 97.8 °F (36.6 °C) (01/03/23 2222)  Pulse: 92 (01/03/23 2222)  Resp: 18 (01/03/23 2259)  BP: (!) 203/124 (01/03/23 2222)  SpO2: 97 % (01/03/23 2222)   Vital Signs (24h Range):  Temp:  [97.8 °F (36.6 °C)] 97.8 °F (36.6 °C)  Pulse:  [92] 92  Resp:  [18-20] 18  SpO2:  [97 %] 97 %  BP: (203)/(124) 203/124     Weight: 117.9 kg (260 lb)  Body mass index is 35.26 kg/m².    SpO2: 97 %       No intake or output data in the 24 hours ending 01/04/23 0015    Lines/Drains/Airways       Peripheral Intravenous Line  Duration                  Peripheral IV - Single Lumen 01/03/23 2257 18 G Posterior;Right Hand <1 day         Peripheral IV - Single Lumen 01/03/23 2258 18 G Distal;Left;Posterior Forearm <1 day                        Significant Labs:   Recent Lab Results         01/03/23 2235        Albumin/Globulin Ratio 1.1       Albumin 3.7       Alkaline Phosphatase 90       ALT 35       aPTT 27.3  Comment: For Minimal Heparin Infusion, the goal aPTT 64-85 seconds corresponds to an anti-Xa of 0.3-0.5.    For Low Intensity and High Intensity Heparin, the goal aPTT  seconds corresponds to an anti-Xa of 0.3-0.7       AST 18       Baso # 0.05       Basophil % 0.4       BILIRUBIN TOTAL 0.4       BNP 67.0       BUN 17.0       Calcium 9.1       Chloride 109       CO2 22              CPK MB 2.8       Creatinine 1.38       eGFR 56       Eos # 0.36       Eosinophil % 3.1       Globulin, Total 3.3       Glucose 136       Hematocrit 50.5       Hemoglobin 16.7       Immature Grans (Abs) 0.05       Immature Granulocytes 0.4       INR 1.04       Lymph # 3.37       LYMPH % 28.7       Magnesium 2.20       MCH 30.2       MCHC 33.1       MCV 91.3       Mono # 1.09       Mono % 9.3       MPV 10.1       Neut # 6.84       Neut % 58.1       Platelets 189       Potassium 4.1       PROTEIN TOTAL 7.0       Protime 13.5       RBC 5.53       RDW  13.5       Sodium 141       Troponin I 0.437       WBC 11.8               Significant Imaging: EKG: SR, ST depressions I, II, V3-V6  and X-Ray: CXR: X-Ray Chest 1 View (CXR): No results found for this visit on 01/03/23.    Physical Exam  Constitutional:       Appearance: Normal appearance. He is normal weight.   HENT:      Head: Normocephalic and atraumatic.      Nose: Nose normal.   Eyes:      Extraocular Movements: Extraocular movements intact.   Cardiovascular:      Rate and Rhythm: Normal rate and regular rhythm.      Heart sounds: Normal heart sounds.   Pulmonary:      Effort: Pulmonary effort is normal.      Breath sounds: Normal breath sounds.   Abdominal:      Palpations: Abdomen is soft.   Neurological:      General: No focal deficit present.      Mental Status: He is alert and oriented to person, place, and time.   Psychiatric:         Mood and Affect: Mood normal.         Judgment: Judgment normal.         Current Inpatient Medications:    Current Facility-Administered Medications:     heparin 25,000 units in dextrose 5% (100 units/ml) IV bolus from bag - ADDITIONAL PRN BOLUS - 30 units/kg (max bolus 4000 units), 30 Units/kg (Adjusted), Intravenous, PRN, Wilmer B Maryann, DO    heparin 25,000 units in dextrose 5% (100 units/ml) IV bolus from bag - ADDITIONAL PRN BOLUS - 60 units/kg (max bolus 4000 units), 42.7 Units/kg (Adjusted), Intravenous, PRN, Wilmer B Maryann, DO    heparin 25,000 units in dextrose 5% 250 mL (100 units/mL) infusion LOW INTENSITY nomogram - [facility], 0-40 Units/kg/hr (Adjusted), Intravenous, Continuous, Wilmer B Maryann, DO, Last Rate: 11.2 mL/hr at 01/03/23 2332, 11.953 Units/kg/hr at 01/03/23 2332    nitroGLYCERIN in 5 % dextrose 50 mg/250 mL (200 mcg/mL) infusion, 0-400 mcg/min, Intravenous, Continuous, Wilmer B Maryann, DO, Last Rate: 1.5 mL/hr at 01/03/23 2338, 5 mcg/min at 01/03/23 2338  No current outpatient medications on file.      VTE Risk Mitigation (From admission,  onward)           Ordered     heparin 25,000 units in dextrose 5% (100 units/ml) IV bolus from bag - ADDITIONAL PRN BOLUS - 60 units/kg (max bolus 4000 units)  As needed (PRN)        Question:  Heparin Infusion Adjustment (DO NOT MODIFY ANSWER)  Answer:  [email or web address]\epic\Images\Pharmacy\HeparinInfusions\heparin LOW INTENSITY nomogram for [facility] [id number].pdf    01/03/23 2233     heparin 25,000 units in dextrose 5% (100 units/ml) IV bolus from bag - ADDITIONAL PRN BOLUS - 30 units/kg (max bolus 4000 units)  As needed (PRN)        Question:  Heparin Infusion Adjustment (DO NOT MODIFY ANSWER)  Answer:  [email or web address]\Images\Pharmacy\HeparinInfusions\heparin LOW INTENSITY nomogram for [facility] [id number]    01/03/23 2233     heparin 25,000 units in dextrose 5% 250 mL (100 units/mL) infusion LOW INTENSITY nomogram - [facility]  Continuous        Question Answer Comment   Begin at (in units/kg/hr) 12    Heparin Infusion Adjustment (DO NOT MODIFY ANSWER) [email or web address]\epic\Images\Pharmacy\HeparinInfusions\heparin LOW INTENSITY nomogram for [facility] [id number]        01/03/23 2233                  Assessment :     NSTEMI  CAD s/p PCI approximately 17 years ago  Elevated BP without diagnosis of HTN  Smoker  DELTA    Plan:     CP suspicious for angina with associated troponin elevation and ischemic EKG changes. Recommend heparin bolus and infusion,  mg po now and then 81 mg po daily, brilinta 180 mg now and then 90 mg po BID, lipitor 40 mg now and qhs, tridil infusion titrate for CP relief, toprol xl 25 mg now and then daily, trend troponins, monitor on telemetry, obtain echocardiogram in the morning (can be done once at transfer facility) and transfer to tertiary facility with interventional cardiology for further evaluation and treatment.    Thank you for allowing me to participate in this patient's care. Call with any questions or concerns.      Abril Faust NP  Cardiology  [facility] - Emergency Dept  01/04/2023  12:15 AM

## 2023-05-11 ENCOUNTER — HOSPITAL ENCOUNTER (OUTPATIENT)
Dept: RADIOLOGY | Facility: HOSPITAL | Age: 68
Discharge: HOME | End: 2023-05-11
Attending: FAMILY MEDICINE
Payer: MEDICARE

## 2023-05-11 DIAGNOSIS — I10 ESSENTIAL (PRIMARY) HYPERTENSION: ICD-10-CM

## 2023-05-11 PROCEDURE — 71046 X-RAY EXAM CHEST 2 VIEWS: CPT | Mod: TC

## 2023-05-25 ENCOUNTER — HOSPITAL ENCOUNTER (OUTPATIENT)
Dept: RADIOLOGY | Facility: HOSPITAL | Age: 68
Discharge: HOME | End: 2023-05-25
Attending: FAMILY MEDICINE
Payer: MEDICARE

## 2023-05-25 DIAGNOSIS — M54.42 ACUTE BACK PAIN WITH SCIATICA, LEFT: ICD-10-CM

## 2023-05-25 PROCEDURE — 72100 X-RAY EXAM L-S SPINE 2/3 VWS: CPT | Mod: TC

## 2023-08-28 DIAGNOSIS — M54.50 LUMBAGO: Primary | ICD-10-CM

## 2023-08-29 ENCOUNTER — HOSPITAL ENCOUNTER (OUTPATIENT)
Dept: RADIOLOGY | Facility: HOSPITAL | Age: 68
Discharge: HOME | End: 2023-08-29
Attending: FAMILY MEDICINE
Payer: MEDICARE

## 2023-08-29 DIAGNOSIS — M54.50 LUMBAGO: ICD-10-CM

## 2023-08-29 PROCEDURE — 72148 MRI LUMBAR SPINE W/O DYE: CPT | Mod: TC

## 2024-01-03 ENCOUNTER — LAB VISIT (OUTPATIENT)
Dept: LAB | Facility: HOSPITAL | Age: 69
End: 2024-01-03
Attending: FAMILY MEDICINE
Payer: MEDICARE

## 2024-01-03 DIAGNOSIS — I10 HYPERTENSION, UNSPECIFIED TYPE: Primary | ICD-10-CM

## 2024-01-03 DIAGNOSIS — Z12.5 SPECIAL SCREENING FOR MALIGNANT NEOPLASM OF PROSTATE: ICD-10-CM

## 2024-01-03 LAB
ALBUMIN SERPL-MCNC: 3.9 G/DL (ref 3.4–4.8)
ALBUMIN/GLOB SERPL: 1 RATIO (ref 1.1–2)
ALP SERPL-CCNC: 90 UNIT/L (ref 40–150)
ALT SERPL-CCNC: 25 UNIT/L (ref 0–55)
AST SERPL-CCNC: 19 UNIT/L (ref 5–34)
BASOPHILS # BLD AUTO: 0.04 X10(3)/MCL
BASOPHILS NFR BLD AUTO: 0.5 %
BILIRUB SERPL-MCNC: 0.7 MG/DL
BUN SERPL-MCNC: 12 MG/DL (ref 8.4–25.7)
CALCIUM SERPL-MCNC: 9.5 MG/DL (ref 8.8–10)
CHLORIDE SERPL-SCNC: 103 MMOL/L (ref 98–107)
CHOLEST SERPL-MCNC: 184 MG/DL
CHOLEST/HDLC SERPL: 5 {RATIO} (ref 0–5)
CO2 SERPL-SCNC: 27 MMOL/L (ref 23–31)
CREAT SERPL-MCNC: 1.66 MG/DL (ref 0.73–1.18)
EOSINOPHIL # BLD AUTO: 0.3 X10(3)/MCL (ref 0–0.9)
EOSINOPHIL NFR BLD AUTO: 3.6 %
ERYTHROCYTE [DISTWIDTH] IN BLOOD BY AUTOMATED COUNT: 13.3 % (ref 11.5–17)
GFR SERPLBLD CREATININE-BSD FMLA CKD-EPI: 44 MLS/MIN/1.73/M2
GLOBULIN SER-MCNC: 3.9 GM/DL (ref 2.4–3.5)
GLUCOSE SERPL-MCNC: 160 MG/DL (ref 82–115)
HCT VFR BLD AUTO: 54.1 % (ref 42–52)
HDLC SERPL-MCNC: 38 MG/DL (ref 35–60)
HGB BLD-MCNC: 17.6 G/DL (ref 14–18)
IMM GRANULOCYTES # BLD AUTO: 0.01 X10(3)/MCL (ref 0–0.04)
IMM GRANULOCYTES NFR BLD AUTO: 0.1 %
LDLC SERPL CALC-MCNC: 102 MG/DL (ref 50–140)
LYMPHOCYTES # BLD AUTO: 2.63 X10(3)/MCL (ref 0.6–4.6)
LYMPHOCYTES NFR BLD AUTO: 32 %
MCH RBC QN AUTO: 29.8 PG (ref 27–31)
MCHC RBC AUTO-ENTMCNC: 32.5 G/DL (ref 33–36)
MCV RBC AUTO: 91.7 FL (ref 80–94)
MONOCYTES # BLD AUTO: 0.54 X10(3)/MCL (ref 0.1–1.3)
MONOCYTES NFR BLD AUTO: 6.6 %
NEUTROPHILS # BLD AUTO: 4.7 X10(3)/MCL (ref 2.1–9.2)
NEUTROPHILS NFR BLD AUTO: 57.2 %
PLATELET # BLD AUTO: 201 X10(3)/MCL (ref 130–400)
PMV BLD AUTO: 10.2 FL (ref 7.4–10.4)
POTASSIUM SERPL-SCNC: 4.2 MMOL/L (ref 3.5–5.1)
PROT SERPL-MCNC: 7.8 GM/DL (ref 5.8–7.6)
PSA SERPL-MCNC: 2.3 NG/ML
RBC # BLD AUTO: 5.9 X10(6)/MCL (ref 4.7–6.1)
SODIUM SERPL-SCNC: 138 MMOL/L (ref 136–145)
TRIGL SERPL-MCNC: 218 MG/DL (ref 34–140)
VLDLC SERPL CALC-MCNC: 44 MG/DL
WBC # SPEC AUTO: 8.22 X10(3)/MCL (ref 4.5–11.5)

## 2024-01-03 PROCEDURE — 80061 LIPID PANEL: CPT

## 2024-01-03 PROCEDURE — 84153 ASSAY OF PSA TOTAL: CPT

## 2024-01-03 PROCEDURE — 36415 COLL VENOUS BLD VENIPUNCTURE: CPT

## 2024-01-03 PROCEDURE — 85025 COMPLETE CBC W/AUTO DIFF WBC: CPT

## 2024-01-03 PROCEDURE — 80053 COMPREHEN METABOLIC PANEL: CPT

## 2024-01-05 ENCOUNTER — LAB VISIT (OUTPATIENT)
Dept: LAB | Facility: HOSPITAL | Age: 69
End: 2024-01-05
Attending: FAMILY MEDICINE
Payer: MEDICARE

## 2024-01-05 DIAGNOSIS — R73.09 ABNORMAL NON-FASTING GLUCOSE: Primary | ICD-10-CM

## 2024-01-05 LAB
EST. AVERAGE GLUCOSE BLD GHB EST-MCNC: 128.4 MG/DL
GLUCOSE P FAST SERPL-MCNC: 132 MG/DL (ref 70–100)
HBA1C MFR BLD: 6.1 %

## 2024-01-05 PROCEDURE — 83036 HEMOGLOBIN GLYCOSYLATED A1C: CPT

## 2024-01-05 PROCEDURE — 82947 ASSAY GLUCOSE BLOOD QUANT: CPT

## 2024-01-05 PROCEDURE — 36415 COLL VENOUS BLD VENIPUNCTURE: CPT

## 2024-05-17 ENCOUNTER — LAB VISIT (OUTPATIENT)
Dept: LAB | Facility: HOSPITAL | Age: 69
End: 2024-05-17
Attending: FAMILY MEDICINE
Payer: MEDICARE

## 2024-05-17 DIAGNOSIS — E11.69 DIABETES MELLITUS ASSOCIATED WITH HORMONAL ETIOLOGY: Primary | ICD-10-CM

## 2024-05-17 LAB
ALBUMIN SERPL-MCNC: 3.6 G/DL (ref 3.4–4.8)
ALBUMIN/GLOB SERPL: 1 RATIO (ref 1.1–2)
ALP SERPL-CCNC: 89 UNIT/L (ref 40–150)
ALT SERPL-CCNC: 17 UNIT/L (ref 0–55)
AST SERPL-CCNC: 15 UNIT/L (ref 5–34)
BASOPHILS # BLD AUTO: 0.02 X10(3)/MCL
BASOPHILS NFR BLD AUTO: 0.3 %
BILIRUB SERPL-MCNC: 0.4 MG/DL
BUN SERPL-MCNC: 14 MG/DL (ref 8.4–25.7)
CALCIUM SERPL-MCNC: 9.5 MG/DL (ref 8.8–10)
CHLORIDE SERPL-SCNC: 106 MMOL/L (ref 98–107)
CHOLEST SERPL-MCNC: 102 MG/DL
CHOLEST/HDLC SERPL: 2 {RATIO} (ref 0–5)
CO2 SERPL-SCNC: 23 MMOL/L (ref 23–31)
CREAT SERPL-MCNC: 1.45 MG/DL (ref 0.73–1.18)
EOSINOPHIL # BLD AUTO: 0.4 X10(3)/MCL (ref 0–0.9)
EOSINOPHIL NFR BLD AUTO: 5.4 %
ERYTHROCYTE [DISTWIDTH] IN BLOOD BY AUTOMATED COUNT: 13.5 % (ref 11.5–17)
EST. AVERAGE GLUCOSE BLD GHB EST-MCNC: 116.9 MG/DL
GFR SERPLBLD CREATININE-BSD FMLA CKD-EPI: 52 ML/MIN/1.73/M2
GLOBULIN SER-MCNC: 3.7 GM/DL (ref 2.4–3.5)
GLUCOSE SERPL-MCNC: 120 MG/DL (ref 82–115)
HBA1C MFR BLD: 5.7 %
HCT VFR BLD AUTO: 47.9 % (ref 42–52)
HDLC SERPL-MCNC: 45 MG/DL (ref 35–60)
HGB BLD-MCNC: 15.6 G/DL (ref 14–18)
IMM GRANULOCYTES # BLD AUTO: 0.02 X10(3)/MCL (ref 0–0.04)
IMM GRANULOCYTES NFR BLD AUTO: 0.3 %
LDLC SERPL CALC-MCNC: 26 MG/DL (ref 50–140)
LYMPHOCYTES # BLD AUTO: 2.03 X10(3)/MCL (ref 0.6–4.6)
LYMPHOCYTES NFR BLD AUTO: 27.5 %
MCH RBC QN AUTO: 29.1 PG (ref 27–31)
MCHC RBC AUTO-ENTMCNC: 32.6 G/DL (ref 33–36)
MCV RBC AUTO: 89.2 FL (ref 80–94)
MICROALBUMIN UR-MCNC: 282.4 UG/ML
MONOCYTES # BLD AUTO: 0.65 X10(3)/MCL (ref 0.1–1.3)
MONOCYTES NFR BLD AUTO: 8.8 %
NEUTROPHILS # BLD AUTO: 4.26 X10(3)/MCL (ref 2.1–9.2)
NEUTROPHILS NFR BLD AUTO: 57.7 %
PLATELET # BLD AUTO: 196 X10(3)/MCL (ref 130–400)
PMV BLD AUTO: 10.2 FL (ref 7.4–10.4)
POTASSIUM SERPL-SCNC: 3.9 MMOL/L (ref 3.5–5.1)
PROT SERPL-MCNC: 7.3 GM/DL (ref 5.8–7.6)
RBC # BLD AUTO: 5.37 X10(6)/MCL (ref 4.7–6.1)
SODIUM SERPL-SCNC: 136 MMOL/L (ref 136–145)
TRIGL SERPL-MCNC: 153 MG/DL (ref 34–140)
TSH SERPL-ACNC: 2.71 UIU/ML (ref 0.35–4.94)
VLDLC SERPL CALC-MCNC: 31 MG/DL
WBC # SPEC AUTO: 7.38 X10(3)/MCL (ref 4.5–11.5)

## 2024-05-17 PROCEDURE — 84443 ASSAY THYROID STIM HORMONE: CPT

## 2024-05-17 PROCEDURE — 83036 HEMOGLOBIN GLYCOSYLATED A1C: CPT

## 2024-05-17 PROCEDURE — 85025 COMPLETE CBC W/AUTO DIFF WBC: CPT

## 2024-05-17 PROCEDURE — 80053 COMPREHEN METABOLIC PANEL: CPT

## 2024-05-17 PROCEDURE — 82043 UR ALBUMIN QUANTITATIVE: CPT

## 2024-05-17 PROCEDURE — 36415 COLL VENOUS BLD VENIPUNCTURE: CPT

## 2024-05-17 PROCEDURE — 80061 LIPID PANEL: CPT

## 2024-10-03 ENCOUNTER — LAB VISIT (OUTPATIENT)
Dept: LAB | Facility: HOSPITAL | Age: 69
End: 2024-10-03
Attending: INTERNAL MEDICINE
Payer: MEDICARE

## 2024-10-03 DIAGNOSIS — I11.9 MALIGNANT HYPERTENSIVE HEART DISEASE WITHOUT HEART FAILURE: ICD-10-CM

## 2024-10-03 DIAGNOSIS — E78.5 HYPERLIPIDEMIA, UNSPECIFIED HYPERLIPIDEMIA TYPE: ICD-10-CM

## 2024-10-03 DIAGNOSIS — N40.0 BENIGN PROSTATIC HYPERPLASIA, UNSPECIFIED WHETHER LOWER URINARY TRACT SYMPTOMS PRESENT: ICD-10-CM

## 2024-10-03 DIAGNOSIS — N18.32 CHRONIC KIDNEY DISEASE (CKD) STAGE G3B/A1, MODERATELY DECREASED GLOMERULAR FILTRATION RATE (GFR) BETWEEN 30-44 ML/MIN/1.73 SQUARE METER AND ALBUMINURIA CREATININE RATIO LESS THAN 30 MG/G: ICD-10-CM

## 2024-10-03 DIAGNOSIS — M50.13 CERVICAL DISC DISORDER WITH RADICULOPATHY OF CERVICOTHORACIC REGION: ICD-10-CM

## 2024-10-03 DIAGNOSIS — E11.69 DIABETES MELLITUS ASSOCIATED WITH HORMONAL ETIOLOGY: Primary | ICD-10-CM

## 2024-10-03 DIAGNOSIS — N18.32 CHRONIC KIDNEY DISEASE (CKD) STAGE G3B/A1, MODERATELY DECREASED GLOMERULAR FILTRATION RATE (GFR) BETWEEN 30-44 ML/MIN/1.73 SQUARE METER AND ALBUMINURIA CREATININE RATIO LESS THAN 30 MG/G: Primary | ICD-10-CM

## 2024-10-03 LAB
25(OH)D3+25(OH)D2 SERPL-MCNC: 33 NG/ML (ref 30–80)
ALBUMIN SERPL-MCNC: 3.6 G/DL (ref 3.4–4.8)
BACTERIA #/AREA URNS AUTO: ABNORMAL /HPF
BILIRUB UR QL STRIP.AUTO: NEGATIVE
BUN SERPL-MCNC: 12 MG/DL (ref 8.4–25.7)
CALCIUM SERPL-MCNC: 8.7 MG/DL (ref 8.8–10)
CHLORIDE SERPL-SCNC: 108 MMOL/L (ref 98–107)
CLARITY UR: ABNORMAL
CO2 SERPL-SCNC: 23 MMOL/L (ref 23–31)
COLOR UR AUTO: ABNORMAL
CREAT SERPL-MCNC: 1.48 MG/DL (ref 0.73–1.18)
CREAT UR-MCNC: 181.8 MG/DL (ref 63–166)
CREAT UR-MCNC: 183.8 MG/DL (ref 63–166)
GFR SERPLBLD CREATININE-BSD FMLA CKD-EPI: 51 ML/MIN/1.73/M2
GLUCOSE SERPL-MCNC: 145 MG/DL (ref 82–115)
GLUCOSE UR QL STRIP: NEGATIVE
HGB UR QL STRIP: ABNORMAL
KETONES UR QL STRIP: NEGATIVE
LEUKOCYTE ESTERASE UR QL STRIP: NEGATIVE
MICROALBUMIN UR-MCNC: 281.8 UG/ML
MICROALBUMIN/CREAT RATIO PNL UR: 153.3 MG/GM CR (ref 0–30)
MUCOUS THREADS URNS QL MICRO: ABNORMAL /LPF
NITRITE UR QL STRIP: NEGATIVE
PH UR STRIP: 5.5 [PH]
PHOSPHATE SERPL-MCNC: 2.2 MG/DL (ref 2.3–4.7)
POTASSIUM SERPL-SCNC: 3.9 MMOL/L (ref 3.5–5.1)
PROT UR QL STRIP: ABNORMAL
PROT UR STRIP-MCNC: 66.2 MG/DL
PTH-INTACT SERPL-MCNC: 70 PG/ML (ref 8.7–77)
RBC #/AREA URNS AUTO: ABNORMAL /HPF
SODIUM SERPL-SCNC: 139 MMOL/L (ref 136–145)
SP GR UR STRIP.AUTO: 1.02 (ref 1–1.03)
SQUAMOUS #/AREA URNS AUTO: ABNORMAL /HPF
URINE PROTEIN/CREATININE RATIO (OLG): 0.4
UROBILINOGEN UR STRIP-ACNC: 0.2
WBC #/AREA URNS AUTO: ABNORMAL /HPF

## 2024-10-03 PROCEDURE — 82570 ASSAY OF URINE CREATININE: CPT

## 2024-10-03 PROCEDURE — 81003 URINALYSIS AUTO W/O SCOPE: CPT

## 2024-10-03 PROCEDURE — 82652 VIT D 1 25-DIHYDROXY: CPT

## 2024-10-03 PROCEDURE — 81001 URINALYSIS AUTO W/SCOPE: CPT

## 2024-10-03 PROCEDURE — 80069 RENAL FUNCTION PANEL: CPT

## 2024-10-03 PROCEDURE — 84156 ASSAY OF PROTEIN URINE: CPT

## 2024-10-03 PROCEDURE — 87086 URINE CULTURE/COLONY COUNT: CPT

## 2024-10-03 PROCEDURE — 36415 COLL VENOUS BLD VENIPUNCTURE: CPT

## 2024-10-03 PROCEDURE — 82306 VITAMIN D 25 HYDROXY: CPT

## 2024-10-03 PROCEDURE — 83970 ASSAY OF PARATHORMONE: CPT

## 2024-10-05 LAB — BACTERIA UR CULT: NO GROWTH

## 2024-10-07 LAB — 1,25(OH)2D SERPL-MCNC: 32 PG/ML (ref 18–64)

## 2024-10-09 ENCOUNTER — HOSPITAL ENCOUNTER (OUTPATIENT)
Dept: RADIOLOGY | Facility: HOSPITAL | Age: 69
Discharge: HOME | End: 2024-10-09
Attending: INTERNAL MEDICINE
Payer: MEDICARE

## 2024-10-09 DIAGNOSIS — N18.32 CHRONIC KIDNEY DISEASE (CKD) STAGE G3B/A1, MODERATELY DECREASED GLOMERULAR FILTRATION RATE (GFR) BETWEEN 30-44 ML/MIN/1.73 SQUARE METER AND ALBUMINURIA CREATININE RATIO LESS THAN 30 MG/G: ICD-10-CM

## 2024-10-09 PROCEDURE — 93975 VASCULAR STUDY: CPT | Mod: TC

## 2024-11-15 ENCOUNTER — LAB VISIT (OUTPATIENT)
Dept: LAB | Facility: HOSPITAL | Age: 69
End: 2024-11-15
Attending: FAMILY MEDICINE
Payer: MEDICARE

## 2024-11-15 DIAGNOSIS — E78.5 HYPERLIPIDEMIA, UNSPECIFIED HYPERLIPIDEMIA TYPE: Primary | ICD-10-CM

## 2024-11-15 DIAGNOSIS — E11.69 DIABETES MELLITUS ASSOCIATED WITH HORMONAL ETIOLOGY: ICD-10-CM

## 2024-11-15 LAB
ALBUMIN SERPL-MCNC: 3.7 G/DL (ref 3.4–4.8)
ALBUMIN/GLOB SERPL: 0.9 RATIO (ref 1.1–2)
ALP SERPL-CCNC: 81 UNIT/L (ref 40–150)
ALT SERPL-CCNC: 25 UNIT/L (ref 0–55)
ANION GAP SERPL CALC-SCNC: 10 MEQ/L
AST SERPL-CCNC: 19 UNIT/L (ref 5–34)
BASOPHILS # BLD AUTO: 0.03 X10(3)/MCL
BASOPHILS NFR BLD AUTO: 0.3 %
BILIRUB SERPL-MCNC: 0.4 MG/DL
BUN SERPL-MCNC: 16 MG/DL (ref 8.4–25.7)
CALCIUM SERPL-MCNC: 9.4 MG/DL (ref 8.8–10)
CHLORIDE SERPL-SCNC: 104 MMOL/L (ref 98–107)
CHOLEST SERPL-MCNC: 112 MG/DL
CHOLEST/HDLC SERPL: 2 {RATIO} (ref 0–5)
CO2 SERPL-SCNC: 23 MMOL/L (ref 23–31)
CREAT SERPL-MCNC: 1.35 MG/DL (ref 0.72–1.25)
CREAT UR-MCNC: 123.9 MG/DL (ref 63–166)
CREAT/UREA NIT SERPL: 12
EOSINOPHIL # BLD AUTO: 0.57 X10(3)/MCL (ref 0–0.9)
EOSINOPHIL NFR BLD AUTO: 6 %
ERYTHROCYTE [DISTWIDTH] IN BLOOD BY AUTOMATED COUNT: 13.1 % (ref 11.5–17)
EST. AVERAGE GLUCOSE BLD GHB EST-MCNC: 119.8 MG/DL
GFR SERPLBLD CREATININE-BSD FMLA CKD-EPI: 56 ML/MIN/1.73/M2
GLOBULIN SER-MCNC: 3.9 GM/DL (ref 2.4–3.5)
GLUCOSE SERPL-MCNC: 96 MG/DL (ref 82–115)
HBA1C MFR BLD: 5.8 %
HCT VFR BLD AUTO: 50.1 % (ref 42–52)
HDLC SERPL-MCNC: 45 MG/DL (ref 35–60)
HGB BLD-MCNC: 17 G/DL (ref 14–18)
IMM GRANULOCYTES # BLD AUTO: 0.02 X10(3)/MCL (ref 0–0.04)
IMM GRANULOCYTES NFR BLD AUTO: 0.2 %
LDLC SERPL CALC-MCNC: 22 MG/DL (ref 50–140)
LYMPHOCYTES # BLD AUTO: 3.12 X10(3)/MCL (ref 0.6–4.6)
LYMPHOCYTES NFR BLD AUTO: 32.7 %
MCH RBC QN AUTO: 30.3 PG (ref 27–31)
MCHC RBC AUTO-ENTMCNC: 33.9 G/DL (ref 33–36)
MCV RBC AUTO: 89.3 FL (ref 80–94)
MONOCYTES # BLD AUTO: 0.94 X10(3)/MCL (ref 0.1–1.3)
MONOCYTES NFR BLD AUTO: 9.8 %
NEUTROPHILS # BLD AUTO: 4.87 X10(3)/MCL (ref 2.1–9.2)
NEUTROPHILS NFR BLD AUTO: 51 %
PLATELET # BLD AUTO: 199 X10(3)/MCL (ref 130–400)
PMV BLD AUTO: 9.8 FL (ref 7.4–10.4)
POTASSIUM SERPL-SCNC: 4.5 MMOL/L (ref 3.5–5.1)
PROT SERPL-MCNC: 7.6 GM/DL (ref 5.8–7.6)
RBC # BLD AUTO: 5.61 X10(6)/MCL (ref 4.7–6.1)
SODIUM SERPL-SCNC: 137 MMOL/L (ref 136–145)
TRIGL SERPL-MCNC: 226 MG/DL (ref 34–140)
TSH SERPL-ACNC: 2.86 UIU/ML (ref 0.35–4.94)
VLDLC SERPL CALC-MCNC: 45 MG/DL
WBC # BLD AUTO: 9.55 X10(3)/MCL (ref 4.5–11.5)

## 2024-11-15 PROCEDURE — 80061 LIPID PANEL: CPT

## 2024-11-15 PROCEDURE — 84443 ASSAY THYROID STIM HORMONE: CPT

## 2024-11-15 PROCEDURE — 85025 COMPLETE CBC W/AUTO DIFF WBC: CPT

## 2024-11-15 PROCEDURE — 83036 HEMOGLOBIN GLYCOSYLATED A1C: CPT

## 2024-11-15 PROCEDURE — 36415 COLL VENOUS BLD VENIPUNCTURE: CPT

## 2024-11-15 PROCEDURE — 80053 COMPREHEN METABOLIC PANEL: CPT

## 2024-11-15 PROCEDURE — 82570 ASSAY OF URINE CREATININE: CPT

## 2024-11-19 DIAGNOSIS — N20.0 CALCULUS OF KIDNEY: Primary | ICD-10-CM

## 2024-11-22 ENCOUNTER — HOSPITAL ENCOUNTER (OUTPATIENT)
Dept: RADIOLOGY | Facility: HOSPITAL | Age: 69
Discharge: HOME | End: 2024-11-22
Attending: FAMILY MEDICINE
Payer: MEDICARE

## 2024-11-22 DIAGNOSIS — N20.0 CALCULUS OF KIDNEY: ICD-10-CM

## 2024-11-22 PROCEDURE — 74176 CT ABD & PELVIS W/O CONTRAST: CPT | Mod: TC

## 2025-01-06 ENCOUNTER — HOSPITAL ENCOUNTER (OUTPATIENT)
Dept: RADIOLOGY | Facility: HOSPITAL | Age: 70
Discharge: HOME | End: 2025-01-06
Attending: STUDENT IN AN ORGANIZED HEALTH CARE EDUCATION/TRAINING PROGRAM
Payer: MEDICARE

## 2025-01-06 ENCOUNTER — CLINICAL SUPPORT (OUTPATIENT)
Dept: RESPIRATORY THERAPY | Facility: HOSPITAL | Age: 70
End: 2025-01-06
Attending: STUDENT IN AN ORGANIZED HEALTH CARE EDUCATION/TRAINING PROGRAM
Payer: MEDICARE

## 2025-01-06 DIAGNOSIS — Z01.811 PRE-OP CHEST EXAM: ICD-10-CM

## 2025-01-06 DIAGNOSIS — Z01.818 PRE-OP EVALUATION: ICD-10-CM

## 2025-01-06 LAB
OHS QRS DURATION: 100 MS
OHS QTC CALCULATION: 423 MS

## 2025-01-06 PROCEDURE — 93005 ELECTROCARDIOGRAM TRACING: CPT

## 2025-01-06 PROCEDURE — 93010 ELECTROCARDIOGRAM REPORT: CPT | Mod: ,,, | Performed by: STUDENT IN AN ORGANIZED HEALTH CARE EDUCATION/TRAINING PROGRAM

## 2025-01-06 PROCEDURE — 71046 X-RAY EXAM CHEST 2 VIEWS: CPT | Mod: TC

## 2025-01-06 RX ORDER — METOPROLOL TARTRATE 25 MG/1
TABLET, FILM COATED ORAL
COMMUNITY
Start: 2024-08-30

## 2025-01-06 RX ORDER — VALSARTAN 160 MG/1
160 TABLET ORAL NIGHTLY
COMMUNITY
Start: 2024-12-23

## 2025-01-06 RX ORDER — METFORMIN HYDROCHLORIDE 500 MG/1
500 TABLET ORAL
COMMUNITY
Start: 2024-11-11

## 2025-01-06 RX ORDER — SERTRALINE HYDROCHLORIDE 50 MG/1
50 TABLET, FILM COATED ORAL NIGHTLY
COMMUNITY
Start: 2024-12-24

## 2025-01-06 RX ORDER — EVOLOCUMAB 140 MG/ML
INJECTION, SOLUTION SUBCUTANEOUS
COMMUNITY
Start: 2024-09-18

## 2025-01-06 RX ORDER — DOCUSATE CALCIUM 240 MG
240 CAPSULE ORAL EVERY MORNING
COMMUNITY

## 2025-01-06 RX ORDER — HYDROGEN PEROXIDE 3 %
20 SOLUTION, NON-ORAL MISCELLANEOUS
COMMUNITY

## 2025-01-06 RX ORDER — TAMSULOSIN HYDROCHLORIDE 0.4 MG/1
1 CAPSULE ORAL NIGHTLY
COMMUNITY
Start: 2024-12-29

## 2025-01-06 RX ORDER — ASPIRIN 325 MG
325 TABLET ORAL DAILY
COMMUNITY

## 2025-01-06 NOTE — DISCHARGE INSTRUCTIONS
Friday 1-10-25 between 1:00 and 4:00pm you will receive a phone call with your arrival time---Sunday 1-12-25 nothing to eat or drink after midnight--- Monday 1-13-25 take metoprolol with small sip of water--must have  upon discharge        DRINK PLENTY OF LIQUIDS TODAY TO HELP WITH ANY BURNING OR BLEEDING WHEN URINATING.

## 2025-01-10 ENCOUNTER — ANESTHESIA EVENT (OUTPATIENT)
Dept: SURGERY | Facility: HOSPITAL | Age: 70
End: 2025-01-10
Payer: MEDICARE

## 2025-01-10 NOTE — ANESTHESIA PREPROCEDURE EVALUATION
01/10/2025  Deacon Delgado is a 69 y.o., male.      Pre-op Assessment    I have reviewed the Patient Summary Reports.     I have reviewed the Nursing Notes. I have reviewed the NPO Status.   I have reviewed the Medications.     Review of Systems  Anesthesia Hx:             Denies Family Hx of Anesthesia complications.    Denies Personal Hx of Anesthesia complications.                    Social:  Former Smoker, No Alcohol Use       Hematology/Oncology:  Hematology Normal   Oncology Normal                                   EENT/Dental:  EENT/Dental Normal           Cardiovascular:        CAD  asymptomatic            ECG has been reviewed.                            Pulmonary:  Pulmonary Normal                       Renal/:  Chronic Renal Disease                Hepatic/GI:  Hepatic/GI Normal                    Musculoskeletal:  Musculoskeletal Normal                Neurological:  Neurology Normal                                      Endocrine:  Endocrine Normal            Dermatological:  Skin Normal    Psych:  Psychiatric History                  Physical Exam  General: Cooperative, Alert and Oriented    Airway:  Mallampati: II   Mouth Opening: Normal  TM Distance: Normal  Tongue: Normal  Neck ROM: Normal ROM    Dental:  Intact        Anesthesia Plan  Type of Anesthesia, risks & benefits discussed:    Anesthesia Type: Gen Supraglottic Airway  Intra-op Monitoring Plan: Standard ASA Monitors  Post Op Pain Control Plan:   (medical reason for not using multimodal pain management)  Induction:  IV  Informed Consent: Informed consent signed with the Patient and all parties understand the risks and agree with anesthesia plan.  All questions answered. Patient consented to blood products? Yes  ASA Score: 3    Ready For Surgery From Anesthesia Perspective.     .

## 2025-01-13 ENCOUNTER — ANESTHESIA (OUTPATIENT)
Dept: SURGERY | Facility: HOSPITAL | Age: 70
End: 2025-01-13
Payer: MEDICARE

## 2025-01-13 ENCOUNTER — HOSPITAL ENCOUNTER (OUTPATIENT)
Facility: HOSPITAL | Age: 70
Discharge: HOME | End: 2025-01-13
Attending: STUDENT IN AN ORGANIZED HEALTH CARE EDUCATION/TRAINING PROGRAM | Admitting: STUDENT IN AN ORGANIZED HEALTH CARE EDUCATION/TRAINING PROGRAM
Payer: MEDICARE

## 2025-01-13 VITALS
OXYGEN SATURATION: 93 % | BODY MASS INDEX: 32.51 KG/M2 | TEMPERATURE: 98 F | RESPIRATION RATE: 18 BRPM | HEIGHT: 72 IN | DIASTOLIC BLOOD PRESSURE: 70 MMHG | WEIGHT: 240 LBS | SYSTOLIC BLOOD PRESSURE: 136 MMHG | HEART RATE: 92 BPM

## 2025-01-13 DIAGNOSIS — N20.0 KIDNEY STONE: ICD-10-CM

## 2025-01-13 DIAGNOSIS — N20.0 KIDNEY STONES: Primary | ICD-10-CM

## 2025-01-13 LAB — POCT GLUCOSE: 135 MG/DL (ref 70–110)

## 2025-01-13 PROCEDURE — 71000016 HC POSTOP RECOV ADDL HR: Performed by: STUDENT IN AN ORGANIZED HEALTH CARE EDUCATION/TRAINING PROGRAM

## 2025-01-13 PROCEDURE — 82365 CALCULUS SPECTROSCOPY: CPT | Performed by: STUDENT IN AN ORGANIZED HEALTH CARE EDUCATION/TRAINING PROGRAM

## 2025-01-13 PROCEDURE — 63600175 PHARM REV CODE 636 W HCPCS: Performed by: STUDENT IN AN ORGANIZED HEALTH CARE EDUCATION/TRAINING PROGRAM

## 2025-01-13 PROCEDURE — 88300 SURGICAL PATH GROSS: CPT

## 2025-01-13 PROCEDURE — C1773 RET DEV, INSERTABLE: HCPCS | Performed by: STUDENT IN AN ORGANIZED HEALTH CARE EDUCATION/TRAINING PROGRAM

## 2025-01-13 PROCEDURE — D9220A PRA ANESTHESIA: Mod: ,,, | Performed by: NURSE ANESTHETIST, CERTIFIED REGISTERED

## 2025-01-13 PROCEDURE — 36000707: Performed by: STUDENT IN AN ORGANIZED HEALTH CARE EDUCATION/TRAINING PROGRAM

## 2025-01-13 PROCEDURE — C1894 INTRO/SHEATH, NON-LASER: HCPCS | Performed by: STUDENT IN AN ORGANIZED HEALTH CARE EDUCATION/TRAINING PROGRAM

## 2025-01-13 PROCEDURE — C1747 OPTIME ENDOSCOPE, SINGLE, URINARY TRACT: HCPCS | Performed by: STUDENT IN AN ORGANIZED HEALTH CARE EDUCATION/TRAINING PROGRAM

## 2025-01-13 PROCEDURE — 25500020 PHARM REV CODE 255: Performed by: STUDENT IN AN ORGANIZED HEALTH CARE EDUCATION/TRAINING PROGRAM

## 2025-01-13 PROCEDURE — 71000033 HC RECOVERY, INTIAL HOUR: Performed by: STUDENT IN AN ORGANIZED HEALTH CARE EDUCATION/TRAINING PROGRAM

## 2025-01-13 PROCEDURE — 63600175 PHARM REV CODE 636 W HCPCS: Performed by: NURSE ANESTHETIST, CERTIFIED REGISTERED

## 2025-01-13 PROCEDURE — C1769 GUIDE WIRE: HCPCS | Performed by: STUDENT IN AN ORGANIZED HEALTH CARE EDUCATION/TRAINING PROGRAM

## 2025-01-13 PROCEDURE — 25000003 PHARM REV CODE 250: Performed by: NURSE ANESTHETIST, CERTIFIED REGISTERED

## 2025-01-13 PROCEDURE — 27201423 OPTIME MED/SURG SUP & DEVICES STERILE SUPPLY: Performed by: STUDENT IN AN ORGANIZED HEALTH CARE EDUCATION/TRAINING PROGRAM

## 2025-01-13 PROCEDURE — 37000009 HC ANESTHESIA EA ADD 15 MINS: Performed by: STUDENT IN AN ORGANIZED HEALTH CARE EDUCATION/TRAINING PROGRAM

## 2025-01-13 PROCEDURE — 71000015 HC POSTOP RECOV 1ST HR: Performed by: STUDENT IN AN ORGANIZED HEALTH CARE EDUCATION/TRAINING PROGRAM

## 2025-01-13 PROCEDURE — C1758 CATHETER, URETERAL: HCPCS | Performed by: STUDENT IN AN ORGANIZED HEALTH CARE EDUCATION/TRAINING PROGRAM

## 2025-01-13 PROCEDURE — 36000706: Performed by: STUDENT IN AN ORGANIZED HEALTH CARE EDUCATION/TRAINING PROGRAM

## 2025-01-13 PROCEDURE — C2617 STENT, NON-COR, TEM W/O DEL: HCPCS | Performed by: STUDENT IN AN ORGANIZED HEALTH CARE EDUCATION/TRAINING PROGRAM

## 2025-01-13 PROCEDURE — 25000003 PHARM REV CODE 250: Performed by: ANESTHESIOLOGY

## 2025-01-13 PROCEDURE — 37000008 HC ANESTHESIA 1ST 15 MINUTES: Performed by: STUDENT IN AN ORGANIZED HEALTH CARE EDUCATION/TRAINING PROGRAM

## 2025-01-13 PROCEDURE — 63600175 PHARM REV CODE 636 W HCPCS: Performed by: ANESTHESIOLOGY

## 2025-01-13 DEVICE — URETERAL STENT
Type: IMPLANTABLE DEVICE | Site: URETER | Status: FUNCTIONAL
Brand: PERCUFLEX™

## 2025-01-13 RX ORDER — SODIUM CHLORIDE 0.9 % (FLUSH) 0.9 %
10 SYRINGE (ML) INJECTION
Status: DISCONTINUED | OUTPATIENT
Start: 2025-01-13 | End: 2025-01-13

## 2025-01-13 RX ORDER — GLUCAGON 1 MG
1 KIT INJECTION
Status: DISCONTINUED | OUTPATIENT
Start: 2025-01-13 | End: 2025-01-13

## 2025-01-13 RX ORDER — DIAZEPAM 5 MG/1
10 TABLET ORAL ONCE
Status: COMPLETED | OUTPATIENT
Start: 2025-01-13 | End: 2025-01-13

## 2025-01-13 RX ORDER — PROPOFOL 10 MG/ML
VIAL (ML) INTRAVENOUS
Status: DISCONTINUED | OUTPATIENT
Start: 2025-01-13 | End: 2025-01-13

## 2025-01-13 RX ORDER — CEFAZOLIN SODIUM 1 G/3ML
2 INJECTION, POWDER, FOR SOLUTION INTRAMUSCULAR; INTRAVENOUS
Status: COMPLETED | OUTPATIENT
Start: 2025-01-13 | End: 2025-01-13

## 2025-01-13 RX ORDER — EPHEDRINE SULFATE 50 MG/ML
INJECTION, SOLUTION INTRAVENOUS
Status: DISCONTINUED | OUTPATIENT
Start: 2025-01-13 | End: 2025-01-13

## 2025-01-13 RX ORDER — HYDROMORPHONE HYDROCHLORIDE 2 MG/ML
0.2 INJECTION, SOLUTION INTRAMUSCULAR; INTRAVENOUS; SUBCUTANEOUS EVERY 5 MIN PRN
Status: DISCONTINUED | OUTPATIENT
Start: 2025-01-13 | End: 2025-01-13

## 2025-01-13 RX ORDER — FENTANYL CITRATE 50 UG/ML
INJECTION, SOLUTION INTRAMUSCULAR; INTRAVENOUS
Status: DISCONTINUED | OUTPATIENT
Start: 2025-01-13 | End: 2025-01-13

## 2025-01-13 RX ORDER — FENTANYL CITRATE 50 UG/ML
25 INJECTION, SOLUTION INTRAMUSCULAR; INTRAVENOUS EVERY 5 MIN PRN
Status: DISCONTINUED | OUTPATIENT
Start: 2025-01-13 | End: 2025-01-13

## 2025-01-13 RX ORDER — ONDANSETRON HYDROCHLORIDE 2 MG/ML
INJECTION, SOLUTION INTRAVENOUS
Status: DISCONTINUED | OUTPATIENT
Start: 2025-01-13 | End: 2025-01-13

## 2025-01-13 RX ORDER — PHENYLEPHRINE HYDROCHLORIDE 10 MG/ML
INJECTION INTRAVENOUS
Status: DISCONTINUED | OUTPATIENT
Start: 2025-01-13 | End: 2025-01-13

## 2025-01-13 RX ORDER — DEXAMETHASONE SODIUM PHOSPHATE 4 MG/ML
INJECTION, SOLUTION INTRA-ARTICULAR; INTRALESIONAL; INTRAMUSCULAR; INTRAVENOUS; SOFT TISSUE
Status: DISCONTINUED | OUTPATIENT
Start: 2025-01-13 | End: 2025-01-13

## 2025-01-13 RX ORDER — LIDOCAINE HYDROCHLORIDE 20 MG/ML
INJECTION, SOLUTION EPIDURAL; INFILTRATION; INTRACAUDAL; PERINEURAL
Status: DISCONTINUED | OUTPATIENT
Start: 2025-01-13 | End: 2025-01-13

## 2025-01-13 RX ORDER — IOPAMIDOL 612 MG/ML
INJECTION, SOLUTION INTRAVASCULAR
Status: DISCONTINUED | OUTPATIENT
Start: 2025-01-13 | End: 2025-01-13 | Stop reason: HOSPADM

## 2025-01-13 RX ORDER — IBUPROFEN 600 MG/1
600 TABLET ORAL EVERY 6 HOURS PRN
Qty: 8 TABLET | Refills: 0 | Status: SHIPPED | OUTPATIENT
Start: 2025-01-13

## 2025-01-13 RX ADMIN — DEXAMETHASONE SODIUM PHOSPHATE 4 MG: 4 INJECTION, SOLUTION INTRA-ARTICULAR; INTRALESIONAL; INTRAMUSCULAR; INTRAVENOUS; SOFT TISSUE at 07:01

## 2025-01-13 RX ADMIN — FENTANYL CITRATE 100 MCG: 50 INJECTION, SOLUTION INTRAMUSCULAR; INTRAVENOUS at 07:01

## 2025-01-13 RX ADMIN — SODIUM CHLORIDE, POTASSIUM CHLORIDE, SODIUM LACTATE AND CALCIUM CHLORIDE: 600; 310; 30; 20 INJECTION, SOLUTION INTRAVENOUS at 07:01

## 2025-01-13 RX ADMIN — LIDOCAINE HYDROCHLORIDE 60 MG: 20 INJECTION, SOLUTION EPIDURAL; INFILTRATION; INTRACAUDAL; PERINEURAL at 07:01

## 2025-01-13 RX ADMIN — ONDANSETRON 4 MG: 2 INJECTION INTRAMUSCULAR; INTRAVENOUS at 07:01

## 2025-01-13 RX ADMIN — DIAZEPAM 10 MG: 5 TABLET ORAL at 06:01

## 2025-01-13 RX ADMIN — SODIUM CHLORIDE, POTASSIUM CHLORIDE, SODIUM LACTATE AND CALCIUM CHLORIDE 500 ML: 600; 310; 30; 20 INJECTION, SOLUTION INTRAVENOUS at 06:01

## 2025-01-13 RX ADMIN — PROPOFOL 120 MG: 10 INJECTION, EMULSION INTRAVENOUS at 07:01

## 2025-01-13 RX ADMIN — HYDROMORPHONE HYDROCHLORIDE 0.2 MG: 2 INJECTION INTRAMUSCULAR; INTRAVENOUS; SUBCUTANEOUS at 08:01

## 2025-01-13 RX ADMIN — CEFAZOLIN 2 G: 330 INJECTION, POWDER, FOR SOLUTION INTRAMUSCULAR; INTRAVENOUS at 07:01

## 2025-01-13 RX ADMIN — EPHEDRINE SULFATE 25 MG: 50 INJECTION INTRAVENOUS at 07:01

## 2025-01-13 RX ADMIN — PHENYLEPHRINE HYDROCHLORIDE 100 MCG: 10 INJECTION INTRAVENOUS at 08:01

## 2025-01-13 RX ADMIN — PHENYLEPHRINE HYDROCHLORIDE 100 MCG: 10 INJECTION INTRAVENOUS at 07:01

## 2025-01-13 NOTE — OP NOTE
UROLOGY OPERATIVE NOTE    Deacon Delgado  1955  [id number]  1/13/2025      Pre-op Diagnosis:   Right UPJ stone    Post-op Diagnosis:  right UPJ stone    Procedure: Cystoscopy,  right Ureteroscopic Stone Extraction, Laser Lithotripsy,  stone basketing, right Ureteral Stent    Surgeon: Amanda Zhou MD    Assistant: N/A    Anesthesia:  GENERAL LMA    Complications: none    IVF:  crystalloid    Blood Loss:  none    Indications:  obstructing right UPJ stone    Implants:  6 x 26 double-J ureteral stent ( on a string )    Disposition: Taken to the PACU in stable condition    Condition: Doing well without problems    Procedure in Detail:  After appropriate consent was obtained the patient was taken to the OR and placed in the supine position.  Anesthesia was induced.  They were repositioned into dorsal lithotomy.  All lines were placed and pressure points padded.  They were prepped and draped in sterile fashion.  Time out was performed.  The received appropriate preoperative antibiotic therapy (Ancef).      We entered the bladder with a 22F rigid cystoscope.  Systematic inspection was performed revealing no diverticulum, tumor, or stones.  We visualized the  right ureteral orifice effluxing clear urine.  It was cannulated with a glidewire under fluoro.  I then used a 34 cm access sheath to dilate the UO and place a second working wire.  I then advanced a flexible ureteroscope under fluoro and vision to  the UPJ, demonstrating a large calculus, consistent with CT findings.  I used a 273 nanomicron holmium laser fiber to fragment the stone into sub mm fragments and irrigate them out.  I was able to basket out a few small fragments using a ZeroTip basket. The scope was advanced to the renal pelvis.  All calyces were inspected showing no stone fragments, but rather stone dust.  On slow regress I inspected the ureter which was healthy.  I placed a  6 x 26 double-J stent under fluoro with good curl proximal and distal on  fluoro and vision.  The stent was externalized.  I emptied the bladder and terminated the procedure.    The patient was then gently awoken from anesthesia, transferred from the operating room table back to the [facility], and sent to PACU in stable condition.    He will return to my clinic on Thursday, 01/16/2025, for stent on a string removal.

## 2025-01-13 NOTE — ANESTHESIA PROCEDURE NOTES
Intubation    Date/Time: 1/13/2025 7:03 AM    Performed by: Jarrell Huff CRNA  Authorized by: Fredo Ruiz DO    Intubation:     Induction:  Intravenous    Intubated:  Postinduction    Mask Ventilation:  Easy mask    Attempts:  1    Attempted By:  CRNA    Difficult Airway Encountered?: No      Complications:  None    Airway Device:  Supraglottic airway/LMA    Airway Device Size:  4.0    Placement Verified By:  Capnometry    Complicating Factors:  None    Findings Post-Intubation:  BS equal bilateral and atraumatic/condition of teeth unchanged

## 2025-01-13 NOTE — TRANSFER OF CARE
Anesthesia Transfer of Care Note    Patient: Deacon Delgado    Procedure(s) Performed: Procedure(s) (LRB):  CYSTOURETEROSCOPY, WITH HOLMIUM LASER LITHOTRIPSY OF URETERAL CALCULUS AND STENT INSERTION (Paul w/ laser notified) (Right)  CYSTOSCOPY, WITH RETROGRADE PYELOGRAM (Right)  REMOVAL, CALCULUS, URETER (Right)    Patient location: PACU    Anesthesia Type: general    Transport from OR: Transported from OR on room air with adequate spontaneous ventilation    Post pain: adequate analgesia    Post assessment: no apparent anesthetic complications    Post vital signs: stable    Level of consciousness: sedated    Nausea/Vomiting: no nausea/vomiting    Complications: none    Transfer of care protocol was followed      Last vitals: Visit Vitals  /80 (BP Location: Left arm, Patient Position: Lying)   Pulse 89   Temp 37.1 °C (98.8 °F) (Temporal)   Resp 16   Ht 6' (1.829 m)   Wt 108.9 kg (240 lb)   SpO2 95%   BMI 32.55 kg/m²

## 2025-01-13 NOTE — ANESTHESIA POSTPROCEDURE EVALUATION
Anesthesia Post Evaluation    Patient: Deacon Delgado    Procedure(s) Performed: Procedure(s) (LRB):  CYSTOURETEROSCOPY, WITH HOLMIUM LASER LITHOTRIPSY OF URETERAL CALCULUS AND STENT INSERTION (Paul w/ laser notified) (Right)  CYSTOSCOPY, WITH RETROGRADE PYELOGRAM (Right)  REMOVAL, CALCULUS, URETER (Right)    Final Anesthesia Type: general      Patient location during evaluation: PACU  Patient participation: Yes- Able to Participate  Level of consciousness: awake and alert and oriented  Post-procedure vital signs: reviewed and stable  Pain management: adequate  Airway patency: patent    PONV status at discharge: No PONV  Anesthetic complications: no      Cardiovascular status: stable  Respiratory status: unassisted, spontaneous ventilation and room air  Hydration status: euvolemic  Follow-up not needed.              Vitals Value Taken Time   /82 01/13/25 0842   Temp 37.1 °C (98.8 °F) 01/13/25 0810   Pulse 85 01/13/25 0842   Resp 23 01/13/25 0842   SpO2 92 % 01/13/25 0842   Vitals shown include unfiled device data.      Event Time   Out of Recovery 08:44:42         Pain/Zev Score: Pain Rating Prior to Med Admin: 5 (1/13/2025  8:35 AM)  Zev Score: 9 (1/13/2025  8:42 AM)

## 2025-01-17 ENCOUNTER — LAB VISIT (OUTPATIENT)
Dept: LAB | Facility: HOSPITAL | Age: 70
End: 2025-01-17
Attending: INTERNAL MEDICINE
Payer: MEDICARE

## 2025-01-17 DIAGNOSIS — E11.69 DIABETES MELLITUS ASSOCIATED WITH HORMONAL ETIOLOGY: Primary | ICD-10-CM

## 2025-01-17 DIAGNOSIS — E78.5 HYPERLIPIDEMIA, UNSPECIFIED HYPERLIPIDEMIA TYPE: ICD-10-CM

## 2025-01-17 DIAGNOSIS — N20.0 KIDNEY STONE: Primary | ICD-10-CM

## 2025-01-17 DIAGNOSIS — N40.0 BENIGN PROSTATIC HYPERPLASIA, UNSPECIFIED WHETHER LOWER URINARY TRACT SYMPTOMS PRESENT: ICD-10-CM

## 2025-01-17 DIAGNOSIS — I11.9 MALIGNANT HYPERTENSIVE HEART DISEASE WITHOUT HEART FAILURE: ICD-10-CM

## 2025-01-17 DIAGNOSIS — M50.13 CERVICAL DISC DISORDER WITH RADICULOPATHY OF CERVICOTHORACIC REGION: ICD-10-CM

## 2025-01-17 DIAGNOSIS — N18.32 CHRONIC KIDNEY DISEASE (CKD) STAGE G3B/A1, MODERATELY DECREASED GLOMERULAR FILTRATION RATE (GFR) BETWEEN 30-44 ML/MIN/1.73 SQUARE METER AND ALBUMINURIA CREATININE RATIO LESS THAN 30 MG/G: ICD-10-CM

## 2025-01-17 LAB
25(OH)D3+25(OH)D2 SERPL-MCNC: 29 NG/ML (ref 30–80)
ALBUMIN SERPL-MCNC: 3.5 G/DL (ref 3.4–4.8)
BACTERIA #/AREA URNS AUTO: ABNORMAL /HPF
BASOPHILS # BLD AUTO: 0.04 X10(3)/MCL
BASOPHILS NFR BLD AUTO: 0.5 %
BILIRUB UR QL STRIP.AUTO: ABNORMAL
BUN SERPL-MCNC: 19 MG/DL (ref 8.4–25.7)
CALCIUM SERPL-MCNC: 9.3 MG/DL (ref 8.8–10)
CHLORIDE SERPL-SCNC: 106 MMOL/L (ref 98–107)
CLARITY UR: ABNORMAL
CO2 SERPL-SCNC: 23 MMOL/L (ref 23–31)
COLOR UR AUTO: ABNORMAL
CREAT SERPL-MCNC: 1.41 MG/DL (ref 0.72–1.25)
CREAT UR-MCNC: 158.3 MG/DL (ref 63–166)
CREAT UR-MCNC: 158.8 MG/DL (ref 63–166)
EOSINOPHIL # BLD AUTO: 0.28 X10(3)/MCL (ref 0–0.9)
EOSINOPHIL NFR BLD AUTO: 3.7 %
ERYTHROCYTE [DISTWIDTH] IN BLOOD BY AUTOMATED COUNT: 13.5 % (ref 11.5–17)
EST. AVERAGE GLUCOSE BLD GHB EST-MCNC: 122.6 MG/DL
GFR SERPLBLD CREATININE-BSD FMLA CKD-EPI: 54 ML/MIN/1.73/M2
GLUCOSE SERPL-MCNC: 122 MG/DL (ref 82–115)
GLUCOSE UR QL STRIP: NEGATIVE
HBA1C MFR BLD: 5.9 %
HCT VFR BLD AUTO: 49.3 % (ref 42–52)
HGB BLD-MCNC: 16.2 G/DL (ref 14–18)
HGB UR QL STRIP: ABNORMAL
IMM GRANULOCYTES # BLD AUTO: 0.02 X10(3)/MCL (ref 0–0.04)
IMM GRANULOCYTES NFR BLD AUTO: 0.3 %
KETONES UR QL STRIP: ABNORMAL
LEUKOCYTE ESTERASE UR QL STRIP: NEGATIVE
LYMPHOCYTES # BLD AUTO: 2.17 X10(3)/MCL (ref 0.6–4.6)
LYMPHOCYTES NFR BLD AUTO: 28.9 %
MCH RBC QN AUTO: 30.1 PG (ref 27–31)
MCHC RBC AUTO-ENTMCNC: 32.9 G/DL (ref 33–36)
MCV RBC AUTO: 91.6 FL (ref 80–94)
MICROALBUMIN UR-MCNC: 427.6 UG/ML
MICROALBUMIN/CREAT RATIO PNL UR: 269.3 MG/GM CR (ref 0–30)
MONOCYTES # BLD AUTO: 0.72 X10(3)/MCL (ref 0.1–1.3)
MONOCYTES NFR BLD AUTO: 9.6 %
NEUTROPHILS # BLD AUTO: 4.27 X10(3)/MCL (ref 2.1–9.2)
NEUTROPHILS NFR BLD AUTO: 57 %
NITRITE UR QL STRIP: NEGATIVE
NRBC BLD AUTO-RTO: 0 %
PH UR STRIP: 5.5 [PH]
PHOSPHATE SERPL-MCNC: 3.8 MG/DL (ref 2.3–4.7)
PLATELET # BLD AUTO: 198 X10(3)/MCL (ref 130–400)
PMV BLD AUTO: 10.4 FL (ref 7.4–10.4)
POTASSIUM SERPL-SCNC: 3.7 MMOL/L (ref 3.5–5.1)
PROT UR QL STRIP: ABNORMAL
PROT UR STRIP-MCNC: 101.5 MG/DL
PSYCHE PATHOLOGY RESULT: NORMAL
PTH-INTACT SERPL-MCNC: 39.8 PG/ML (ref 8.7–77)
RBC # BLD AUTO: 5.38 X10(6)/MCL (ref 4.7–6.1)
RBC #/AREA URNS AUTO: >100 /HPF
SODIUM SERPL-SCNC: 137 MMOL/L (ref 136–145)
SP GR UR STRIP.AUTO: 1.02 (ref 1–1.03)
SQUAMOUS #/AREA URNS AUTO: ABNORMAL /HPF
URINE PROTEIN/CREATININE RATIO (OLG): 0.6
UROBILINOGEN UR STRIP-ACNC: 1
WBC # BLD AUTO: 7.5 X10(3)/MCL (ref 4.5–11.5)
WBC #/AREA URNS AUTO: ABNORMAL /HPF

## 2025-01-17 PROCEDURE — 84156 ASSAY OF PROTEIN URINE: CPT

## 2025-01-17 PROCEDURE — 85025 COMPLETE CBC W/AUTO DIFF WBC: CPT

## 2025-01-17 PROCEDURE — 83036 HEMOGLOBIN GLYCOSYLATED A1C: CPT

## 2025-01-17 PROCEDURE — 81003 URINALYSIS AUTO W/O SCOPE: CPT

## 2025-01-17 PROCEDURE — 82306 VITAMIN D 25 HYDROXY: CPT

## 2025-01-17 PROCEDURE — 36415 COLL VENOUS BLD VENIPUNCTURE: CPT

## 2025-01-17 PROCEDURE — 83970 ASSAY OF PARATHORMONE: CPT

## 2025-01-17 PROCEDURE — 82652 VIT D 1 25-DIHYDROXY: CPT

## 2025-01-17 PROCEDURE — 80069 RENAL FUNCTION PANEL: CPT

## 2025-01-17 PROCEDURE — 82043 UR ALBUMIN QUANTITATIVE: CPT

## 2025-01-22 LAB — MAYO GENERIC ORDERABLE RESULT: NORMAL

## 2025-02-24 ENCOUNTER — HOSPITAL ENCOUNTER (OUTPATIENT)
Dept: RADIOLOGY | Facility: HOSPITAL | Age: 70
Discharge: HOME | End: 2025-02-24
Attending: STUDENT IN AN ORGANIZED HEALTH CARE EDUCATION/TRAINING PROGRAM
Payer: MEDICARE

## 2025-02-24 DIAGNOSIS — N20.0 KIDNEY STONE: ICD-10-CM

## 2025-02-24 PROCEDURE — 76770 US EXAM ABDO BACK WALL COMP: CPT | Mod: TC

## 2025-05-16 ENCOUNTER — LAB VISIT (OUTPATIENT)
Dept: LAB | Facility: HOSPITAL | Age: 70
End: 2025-05-16
Attending: FAMILY MEDICINE
Payer: MEDICARE

## 2025-05-16 DIAGNOSIS — E11.69 DIABETES MELLITUS ASSOCIATED WITH HORMONAL ETIOLOGY: Primary | ICD-10-CM

## 2025-05-16 DIAGNOSIS — E55.9 VITAMIN D DEFICIENCY: ICD-10-CM

## 2025-05-16 DIAGNOSIS — Z12.5 SPECIAL SCREENING FOR MALIGNANT NEOPLASM OF PROSTATE: ICD-10-CM

## 2025-05-16 DIAGNOSIS — I10 HYPERTENSION, UNSPECIFIED TYPE: ICD-10-CM

## 2025-05-16 LAB
ALBUMIN SERPL-MCNC: 3.9 G/DL (ref 3.4–4.8)
ALBUMIN/GLOB SERPL: 0.9 RATIO (ref 1.1–2)
ALP SERPL-CCNC: 88 UNIT/L (ref 40–150)
ALT SERPL-CCNC: 19 UNIT/L (ref 0–55)
ANION GAP SERPL CALC-SCNC: 8 MEQ/L
AST SERPL-CCNC: 19 UNIT/L (ref 11–45)
BASOPHILS # BLD AUTO: 0.04 X10(3)/MCL
BASOPHILS NFR BLD AUTO: 0.4 %
BILIRUB SERPL-MCNC: 0.5 MG/DL
BUN SERPL-MCNC: 16 MG/DL (ref 8.4–25.7)
CALCIUM SERPL-MCNC: 9.8 MG/DL (ref 8.8–10)
CHLORIDE SERPL-SCNC: 104 MMOL/L (ref 98–107)
CHOLEST SERPL-MCNC: 129 MG/DL
CHOLEST/HDLC SERPL: 3 {RATIO} (ref 0–5)
CO2 SERPL-SCNC: 26 MMOL/L (ref 23–31)
CREAT SERPL-MCNC: 1.47 MG/DL (ref 0.72–1.25)
CREAT UR-MCNC: 103.3 MG/DL (ref 63–166)
CREAT/UREA NIT SERPL: 11
EOSINOPHIL # BLD AUTO: 0.48 X10(3)/MCL (ref 0–0.9)
EOSINOPHIL NFR BLD AUTO: 5.2 %
ERYTHROCYTE [DISTWIDTH] IN BLOOD BY AUTOMATED COUNT: 13.4 % (ref 11.5–17)
EST. AVERAGE GLUCOSE BLD GHB EST-MCNC: 134.1 MG/DL
GFR SERPLBLD CREATININE-BSD FMLA CKD-EPI: 51 ML/MIN/1.73/M2
GLOBULIN SER-MCNC: 4.4 GM/DL (ref 2.4–3.5)
GLUCOSE SERPL-MCNC: 120 MG/DL (ref 82–115)
HBA1C MFR BLD: 6.3 %
HCT VFR BLD AUTO: 52 % (ref 42–52)
HDLC SERPL-MCNC: 46 MG/DL (ref 35–60)
HGB BLD-MCNC: 17.3 G/DL (ref 14–18)
IMM GRANULOCYTES # BLD AUTO: 0.02 X10(3)/MCL (ref 0–0.04)
IMM GRANULOCYTES NFR BLD AUTO: 0.2 %
LDLC SERPL CALC-MCNC: 47 MG/DL (ref 50–140)
LYMPHOCYTES # BLD AUTO: 3.36 X10(3)/MCL (ref 0.6–4.6)
LYMPHOCYTES NFR BLD AUTO: 36.3 %
MCH RBC QN AUTO: 30.5 PG (ref 27–31)
MCHC RBC AUTO-ENTMCNC: 33.3 G/DL (ref 33–36)
MCV RBC AUTO: 91.5 FL (ref 80–94)
MICROALBUMIN UR-MCNC: 9.6 UG/ML
MONOCYTES # BLD AUTO: 0.94 X10(3)/MCL (ref 0.1–1.3)
MONOCYTES NFR BLD AUTO: 10.2 %
NEUTROPHILS # BLD AUTO: 4.42 X10(3)/MCL (ref 2.1–9.2)
NEUTROPHILS NFR BLD AUTO: 47.7 %
NRBC BLD AUTO-RTO: 0 %
PLATELET # BLD AUTO: 193 X10(3)/MCL (ref 130–400)
PMV BLD AUTO: 10 FL (ref 7.4–10.4)
POTASSIUM SERPL-SCNC: 4.2 MMOL/L (ref 3.5–5.1)
PROT SERPL-MCNC: 8.3 GM/DL (ref 5.8–7.6)
PSA SERPL-MCNC: 2.65 NG/ML
RBC # BLD AUTO: 5.68 X10(6)/MCL (ref 4.7–6.1)
SODIUM SERPL-SCNC: 138 MMOL/L (ref 136–145)
TRIGL SERPL-MCNC: 182 MG/DL (ref 34–140)
VLDLC SERPL CALC-MCNC: 36 MG/DL
WBC # BLD AUTO: 9.26 X10(3)/MCL (ref 4.5–11.5)

## 2025-05-16 PROCEDURE — 84153 ASSAY OF PSA TOTAL: CPT

## 2025-05-16 PROCEDURE — 36415 COLL VENOUS BLD VENIPUNCTURE: CPT

## 2025-05-16 PROCEDURE — 80061 LIPID PANEL: CPT

## 2025-05-16 PROCEDURE — 85025 COMPLETE CBC W/AUTO DIFF WBC: CPT

## 2025-05-16 PROCEDURE — 82570 ASSAY OF URINE CREATININE: CPT

## 2025-05-16 PROCEDURE — 80053 COMPREHEN METABOLIC PANEL: CPT

## 2025-05-16 PROCEDURE — 83036 HEMOGLOBIN GLYCOSYLATED A1C: CPT

## 2025-05-16 PROCEDURE — 82043 UR ALBUMIN QUANTITATIVE: CPT

## 2025-06-18 ENCOUNTER — LAB VISIT (OUTPATIENT)
Dept: LAB | Facility: HOSPITAL | Age: 70
End: 2025-06-18
Attending: INTERNAL MEDICINE
Payer: MEDICARE

## 2025-06-18 DIAGNOSIS — E78.5 HYPERLIPIDEMIA, UNSPECIFIED HYPERLIPIDEMIA TYPE: ICD-10-CM

## 2025-06-18 DIAGNOSIS — E11.69 DIABETES MELLITUS ASSOCIATED WITH HORMONAL ETIOLOGY: Primary | ICD-10-CM

## 2025-06-18 DIAGNOSIS — N40.0 BENIGN PROSTATIC HYPERPLASIA, UNSPECIFIED WHETHER LOWER URINARY TRACT SYMPTOMS PRESENT: ICD-10-CM

## 2025-06-18 DIAGNOSIS — M50.13 CERVICAL DISC DISORDER WITH RADICULOPATHY OF CERVICOTHORACIC REGION: ICD-10-CM

## 2025-06-18 DIAGNOSIS — I11.9 MALIGNANT HYPERTENSIVE HEART DISEASE WITHOUT HEART FAILURE: ICD-10-CM

## 2025-06-18 DIAGNOSIS — N18.32 CHRONIC KIDNEY DISEASE (CKD) STAGE G3B/A1, MODERATELY DECREASED GLOMERULAR FILTRATION RATE (GFR) BETWEEN 30-44 ML/MIN/1.73 SQUARE METER AND ALBUMINURIA CREATININE RATIO LESS THAN 30 MG/G: ICD-10-CM

## 2025-06-18 LAB
25(OH)D3+25(OH)D2 SERPL-MCNC: 41 NG/ML (ref 30–80)
ALBUMIN SERPL-MCNC: 3.4 G/DL (ref 3.4–4.8)
BASOPHILS # BLD AUTO: 0.03 X10(3)/MCL
BASOPHILS NFR BLD AUTO: 0.4 %
BILIRUB UR QL STRIP.AUTO: NEGATIVE
BUN SERPL-MCNC: 15 MG/DL (ref 8.4–25.7)
CALCIUM SERPL-MCNC: 9.3 MG/DL (ref 8.8–10)
CHLORIDE SERPL-SCNC: 107 MMOL/L (ref 98–107)
CLARITY UR: CLEAR
CO2 SERPL-SCNC: 24 MMOL/L (ref 23–31)
COLOR UR AUTO: YELLOW
CREAT SERPL-MCNC: 1.54 MG/DL (ref 0.72–1.25)
CREAT UR-MCNC: 166.9 MG/DL (ref 63–166)
CREAT UR-MCNC: 171.6 MG/DL (ref 63–166)
EOSINOPHIL # BLD AUTO: 0.44 X10(3)/MCL (ref 0–0.9)
EOSINOPHIL NFR BLD AUTO: 6.2 %
ERYTHROCYTE [DISTWIDTH] IN BLOOD BY AUTOMATED COUNT: 13.2 % (ref 11.5–17)
GFR SERPLBLD CREATININE-BSD FMLA CKD-EPI: 48 ML/MIN/1.73/M2
GLUCOSE SERPL-MCNC: 164 MG/DL (ref 82–115)
GLUCOSE UR QL STRIP: NEGATIVE
HCT VFR BLD AUTO: 49.3 % (ref 42–52)
HGB BLD-MCNC: 16.4 G/DL (ref 14–18)
HGB UR QL STRIP: NEGATIVE
IMM GRANULOCYTES # BLD AUTO: 0.02 X10(3)/MCL (ref 0–0.04)
IMM GRANULOCYTES NFR BLD AUTO: 0.3 %
KETONES UR QL STRIP: NEGATIVE
LEUKOCYTE ESTERASE UR QL STRIP: NEGATIVE
LYMPHOCYTES # BLD AUTO: 1.86 X10(3)/MCL (ref 0.6–4.6)
LYMPHOCYTES NFR BLD AUTO: 26.3 %
MCH RBC QN AUTO: 30.4 PG (ref 27–31)
MCHC RBC AUTO-ENTMCNC: 33.3 G/DL (ref 33–36)
MCV RBC AUTO: 91.5 FL (ref 80–94)
MICROALBUMIN UR-MCNC: 11 UG/ML
MICROALBUMIN/CREAT RATIO PNL UR: 6.4 MG/GM CR (ref 0–30)
MONOCYTES # BLD AUTO: 0.62 X10(3)/MCL (ref 0.1–1.3)
MONOCYTES NFR BLD AUTO: 8.8 %
NEUTROPHILS # BLD AUTO: 4.09 X10(3)/MCL (ref 2.1–9.2)
NEUTROPHILS NFR BLD AUTO: 58 %
NITRITE UR QL STRIP: NEGATIVE
NRBC BLD AUTO-RTO: 0 %
PH UR STRIP: 5.5 [PH]
PHOSPHATE SERPL-MCNC: 2.6 MG/DL (ref 2.3–4.7)
PLATELET # BLD AUTO: 179 X10(3)/MCL (ref 130–400)
PMV BLD AUTO: 10 FL (ref 7.4–10.4)
POTASSIUM SERPL-SCNC: 4 MMOL/L (ref 3.5–5.1)
PROT UR QL STRIP: NEGATIVE
PROT UR STRIP-MCNC: 14.4 MG/DL
PTH-INTACT SERPL-MCNC: 34.8 PG/ML (ref 8.7–77)
RBC # BLD AUTO: 5.39 X10(6)/MCL (ref 4.7–6.1)
SODIUM SERPL-SCNC: 138 MMOL/L (ref 136–145)
SP GR UR STRIP.AUTO: 1.02 (ref 1–1.03)
URINE PROTEIN/CREATININE RATIO (OLG): 0.1
UROBILINOGEN UR STRIP-ACNC: 0.2
WBC # BLD AUTO: 7.06 X10(3)/MCL (ref 4.5–11.5)

## 2025-06-18 PROCEDURE — 85025 COMPLETE CBC W/AUTO DIFF WBC: CPT

## 2025-06-18 PROCEDURE — 82570 ASSAY OF URINE CREATININE: CPT | Mod: 59

## 2025-06-18 PROCEDURE — 36415 COLL VENOUS BLD VENIPUNCTURE: CPT

## 2025-06-18 PROCEDURE — 82652 VIT D 1 25-DIHYDROXY: CPT

## 2025-06-18 PROCEDURE — 84156 ASSAY OF PROTEIN URINE: CPT

## 2025-06-18 PROCEDURE — 81003 URINALYSIS AUTO W/O SCOPE: CPT

## 2025-06-18 PROCEDURE — 83970 ASSAY OF PARATHORMONE: CPT

## 2025-06-18 PROCEDURE — 82306 VITAMIN D 25 HYDROXY: CPT

## 2025-06-18 PROCEDURE — 80069 RENAL FUNCTION PANEL: CPT

## 2025-06-23 LAB — 1,25(OH)2D SERPL-MCNC: 24 PG/ML (ref 18–64)

## 2025-07-03 DIAGNOSIS — R06.02 SHORTNESS OF BREATH: ICD-10-CM

## 2025-07-03 DIAGNOSIS — I25.10 CORONARY ATHEROSCLEROSIS OF NATIVE CORONARY ARTERY: Primary | ICD-10-CM

## 2025-07-03 DIAGNOSIS — R00.2 PALPITATIONS: ICD-10-CM

## 2025-08-01 ENCOUNTER — HOSPITAL ENCOUNTER (OUTPATIENT)
Dept: RADIOLOGY | Facility: HOSPITAL | Age: 70
Discharge: HOME | End: 2025-08-01
Attending: INTERNAL MEDICINE
Payer: MEDICARE

## 2025-08-01 DIAGNOSIS — R06.02 SHORTNESS OF BREATH: ICD-10-CM

## 2025-08-01 DIAGNOSIS — R00.2 PALPITATIONS: ICD-10-CM

## 2025-08-01 DIAGNOSIS — I25.10 CORONARY ATHEROSCLEROSIS OF NATIVE CORONARY ARTERY: ICD-10-CM

## 2025-08-01 LAB
APICAL FOUR CHAMBER EJECTION FRACTION: 45 %
AV PEAK GRADIENT: 6 MMHG
AV VALVE AREA BY VELOCITY RATIO: 2.4 CM²
AV VELOCITY RATIO: 0.83
CV ECHO LV RWT: 0.57 CM
DOP CALC AO PEAK VEL: 1.2 M/S
DOP CALC LVOT AREA: 2.8 CM2
DOP CALC LVOT DIAMETER: 1.9 CM
DOP CALC LVOT PEAK VEL: 1 M/S
DOP CALC MV VTI: 22.6 CM
E WAVE DECELERATION TIME: 404 MSEC
E/A RATIO: 0.74
ECHO LV POSTERIOR WALL: 1.4 CM (ref 0.6–1.1)
FRACTIONAL SHORTENING: 26.5 % (ref 28–44)
INTERVENTRICULAR SEPTUM: 1.3 CM (ref 0.6–1.1)
LEFT ATRIUM SIZE: 3.4 CM
LEFT INTERNAL DIMENSION IN SYSTOLE: 3.6 CM (ref 2.1–4)
LEFT VENTRICLE DIASTOLIC VOLUME: 114 ML
LEFT VENTRICLE END DIASTOLIC VOLUME APICAL 2 CHAMBER: 0 ML
LEFT VENTRICLE END DIASTOLIC VOLUME APICAL 4 CHAMBER: 38.08 ML
LEFT VENTRICLE SYSTOLIC VOLUME: 54 ML
LEFT VENTRICULAR INTERNAL DIMENSION IN DIASTOLE: 4.9 CM (ref 3.5–6)
LEFT VENTRICULAR MASS: 267.9 G
LVED V (TEICH): 113.88 ML
LVES V (TEICH): 53.85 ML
MV MEAN GRADIENT: 1 MMHG
MV PEAK A VEL: 0.74 M/S
MV PEAK E VEL: 0.55 M/S
MV PEAK GRADIENT: 2 MMHG
MV STENOSIS PRESSURE HALF TIME: 117.3 MS
MV VALVE AREA P 1/2 METHOD: 1.88 CM2
OHS CV RV/LV RATIO: 0.65 CM
PISA TR MAX VEL: 2.2 M/S
PV PEAK GRADIENT: 2 MMHG
PV PEAK VELOCITY: 0.73 M/S
RA PRESSURE ESTIMATED: 3 MMHG
RIGHT VENTRICLE DIASTOLIC BASEL DIMENSION: 3.2 CM
RIGHT VENTRICULAR END-DIASTOLIC DIMENSION: 3.22 CM
RV TB RVSP: 5 MMHG
TR MAX PG: 19 MMHG
TRICUSPID VALVE PEAK A WAVE VELOCITY: 0 M/S
TV PEAK E VEL: 0 M/S
TV REST PULMONARY ARTERY PRESSURE: 22 MMHG
TV STENOSIS PRESSURE HALF TIME: 0 MS

## 2025-08-01 PROCEDURE — 93306 TTE W/DOPPLER COMPLETE: CPT

## 2025-08-14 ENCOUNTER — HOSPITAL ENCOUNTER (OUTPATIENT)
Dept: RADIOLOGY | Facility: HOSPITAL | Age: 70
Discharge: HOME | End: 2025-08-14
Attending: FAMILY MEDICINE
Payer: MEDICARE

## 2025-08-14 DIAGNOSIS — M54.42 LUMBAGO WITH SCIATICA, LEFT SIDE: ICD-10-CM

## 2025-08-14 PROCEDURE — 72100 X-RAY EXAM L-S SPINE 2/3 VWS: CPT | Mod: TC

## 2025-08-14 PROCEDURE — 72220 X-RAY EXAM SACRUM TAILBONE: CPT | Mod: TC

## 2025-08-14 PROCEDURE — 73523 X-RAY EXAM HIPS BI 5/> VIEWS: CPT | Mod: TC

## (undated) DEVICE — TRAY SKIN SCRUB WET PREMIUM

## (undated) DEVICE — GAUZE WOVEN STRL 12-PLY 4X4IN

## (undated) DEVICE — SHEATH FLEXOR ACCESS 12X35

## (undated) DEVICE — SYR 10CC LUER LOCK

## (undated) DEVICE — SET CYSTO IRR DRP CHMBR 84IN

## (undated) DEVICE — GLOVE SIGNATURE ESSNTL LTX 7

## (undated) DEVICE — MARKER SKIN RULER AND LABEL

## (undated) DEVICE — GOWN SMARTGOWN LVL4 X-LONG XL

## (undated) DEVICE — TOWEL OR DISP STRL BLUE 4/PK

## (undated) DEVICE — GUIDEWIRE STF STR .035X150CM

## (undated) DEVICE — GLOVE PROTEXIS LTX 7.5

## (undated) DEVICE — COVER TABLE 44X90 STERILE

## (undated) DEVICE — BOWL STERILE LARGE 32OZ

## (undated) DEVICE — GLOVE SIGNATURE ESSNTL LTX 8

## (undated) DEVICE — BASKET STONE RETRV 1.9FRX120CM

## (undated) DEVICE — DRAPE T CYSTOSCOPY STERILE

## (undated) DEVICE — WIRE GUIDE 0.038OLD

## (undated) DEVICE — SYS EASY CATCHER FLUID DRN

## (undated) DEVICE — GLOVE SIGNATURE ESSNTL LTX 7.5

## (undated) DEVICE — CATH URTRL OPN END STR TP 6F

## (undated) DEVICE — GLOVE SENSICARE PI GRN 7

## (undated) DEVICE — SOL IRRI STRL WATER 1000ML

## (undated) DEVICE — SUPPORT ULNA NERVE PROTECTOR

## (undated) DEVICE — URETEROSCOPE FLX 1.2X650X3.1MM

## (undated) DEVICE — DRAPE LEGGINGS CUFF 33X51IN

## (undated) DEVICE — CATH POLLACK OPEN-END FLEXI-TI

## (undated) DEVICE — BLANKET WARMING UPPER BODY

## (undated) DEVICE — SPONGE COTTON TRAY 4X4IN

## (undated) DEVICE — FIBER HOLMIUM LASER RED 273UM

## (undated) DEVICE — SOL NORMAL USPCA 0.9%

## (undated) DEVICE — GOWN POLY REINF BRTH SLV XL

## (undated) DEVICE — GLOVE SENSICARE NEOPRENE 7